# Patient Record
Sex: FEMALE | Race: WHITE | Employment: OTHER | URBAN - METROPOLITAN AREA
[De-identification: names, ages, dates, MRNs, and addresses within clinical notes are randomized per-mention and may not be internally consistent; named-entity substitution may affect disease eponyms.]

---

## 2017-01-02 DIAGNOSIS — F33.1 MAJOR DEPRESSIVE DISORDER, RECURRENT EPISODE, MODERATE (H): Primary | ICD-10-CM

## 2017-01-02 RX ORDER — SERTRALINE HYDROCHLORIDE 100 MG/1
100 TABLET, FILM COATED ORAL DAILY
Qty: 90 TABLET | Refills: 1 | Status: SHIPPED | OUTPATIENT
Start: 2017-01-02 | End: 2017-07-15

## 2017-01-02 NOTE — TELEPHONE ENCOUNTER
sertraline (ZOLOFT) 100 MG tablet     Last Written Prescription Date: 7/11/16  Last Fill Quantity: 90, # refills: 1  Last Office Visit with [facility] primary care provider:  12/5/16   Next 5 appointments (look out 90 days)     Jan 03, 2017  2:45 PM   [facility]    [address]  [facility] [address]   [phone]                   Last PHQ-9 score on record=   PHQ-9 SCORE 11/28/2016   Total Score -   Total Score 13

## 2017-01-02 NOTE — TELEPHONE ENCOUNTER
"Last routine visit with PCP was 11/28/16.    Unsure about the \"re-check in 6 weeks\", appears it is related to smoking cessation.    Routing refill request to provider for review/approval because:  Elevated PHQ9    Adry Cabrales RN  [facility]            "

## 2017-01-03 ENCOUNTER — OFFICE VISIT (OUTPATIENT)
Dept: ORTHOPEDICS | Facility: CLINIC | Age: 62
End: 2017-01-03
Payer: COMMERCIAL

## 2017-01-03 ENCOUNTER — RADIANT APPOINTMENT (OUTPATIENT)
Dept: GENERAL RADIOLOGY | Facility: CLINIC | Age: 62
End: 2017-01-03
Attending: ORTHOPAEDIC SURGERY
Payer: COMMERCIAL

## 2017-01-03 VITALS — HEIGHT: 63 IN | WEIGHT: 125 LBS | RESPIRATION RATE: 18 BRPM | BODY MASS INDEX: 22.15 KG/M2

## 2017-01-03 DIAGNOSIS — S62.310A CLOSED DISPLACED FRACTURE OF BASE OF SECOND METACARPAL BONE OF RIGHT HAND, INITIAL ENCOUNTER: Primary | ICD-10-CM

## 2017-01-03 DIAGNOSIS — S62.310A CLOSED DISPLACED FRACTURE OF BASE OF SECOND METACARPAL BONE OF RIGHT HAND, INITIAL ENCOUNTER: ICD-10-CM

## 2017-01-03 PROCEDURE — 99207 ZZC FRACTURE CARE IN GLOBAL PERIOD: CPT | Performed by: ORTHOPAEDIC SURGERY

## 2017-01-03 PROCEDURE — 73130 X-RAY EXAM OF HAND: CPT | Mod: RT

## 2017-01-03 NOTE — PATIENT INSTRUCTIONS
Please remember to call and schedule a follow up appointment if one was recommended at your earliest convenience.  Orthopedics CLINIC HOURS TELEPHONE NUMBER   Dr. Micky Gaviria  Surgical Assistant      Daxa  Medical Assistant   Mondays- 8:00 - 4:00  [facility]    Tuesdays- 8:00-4:00  [facility] in the morning and [facility] in the afternoon   Thursdays- 8:00-4:00  [facility] in the morning and [facility] in the afternoon  Specialty schedulers:   [phone] to make an appointment with any Specialty Provider.   Main Clinic:   [phone] to make an appointment with your primary provider   Urgent Care locations:    [facility]   Monday-Friday Closed  Saturday-Sunday 9 am-5pm    Monday-Friday 12 pm - 8 pm  Saturday-Sunday 9 am-5 pm   [phone]     If SURGERY has been recommended, please call our Specialty Schedulers at [phone] to schedule your procedure.    If you need a medication refill, please contact your pharmacy. Please allow 3 business days for your refill to be completed.  Use [employer] (secure e-mail communication and access to your chart) to send a message or to make an appointment. Please ask how you can sign up for [employer]apt[employer].

## 2017-01-03 NOTE — PROGRESS NOTES
Follow up closed treatment of right hand metacarpal base fracture 11/29/16.  Cast removed today.  X-ray now shows progressive healing of fractures, but it now appears there are base fractures of 2-5 metacarpals.  All fractures are in good position and healing well.  There is tenderness at base of 4th and 5th metacarpals.  Sensation, motor and circulation are intact.  Mild chronic wrist stiffness due to old radius fracture.    Assessment:  Healing multiple metacarpal fractures.  Plan:  Start range of motion.  Avoid falling or impact for 2-4 weeks.  Return to clinic 4 weeks if stiffness persists.

## 2017-01-03 NOTE — MR AVS SNAPSHOT
After Visit Summary   1/3/2017    Shruti Gifford    MRN: [id number]           Patient Information     Date Of Birth          1955        Visit Information        Provider Department      1/3/2017 2:45 PM Maximilian Weeks MD [facility]        Today's Diagnoses     Closed displaced fracture of base of second metacarpal bone of right hand, initial encounter    -  1       Care Instructions    Please remember to call and schedule a follow up appointment if one was recommended at your earliest convenience.  Orthopedics CLINIC HOURS TELEPHONE NUMBER   Dr. Micky Gaviria  Surgical Assistant      Daxa  Medical Assistant   Mondays- 8:00 - 4:00  [facility]    Tuesdays- 8:00-4:00  [facility] in the morning and [facility] in the afternoon   Thursdays- 8:00-4:00  [facility] in the morning and [facility] in the afternoon  Specialty schedulers:   [phone] to make an appointment with any Specialty Provider.   Main Clinic:   [phone] to make an appointment with your primary provider   Urgent Care locations:    [facility]   Monday-Friday Closed  Saturday-Sunday 9 am-5pm    Monday-Friday 12 pm - 8 pm  Saturday-Sunday 9 am-5 pm   [phone]     If SURGERY has been recommended, please call our Specialty Schedulers at [phone] to schedule your procedure.    If you need a medication refill, please contact your pharmacy. Please allow 3 business days for your refill to be completed.  Use [employer] (secure e-mail communication and access to your chart) to send a message or to make an appointment. Please ask how you can sign up for [employer].            Follow-ups after your visit        Who to contact     If you have questions or need follow up information about today's clinic visit or your schedule please contact [facility] directly at  "[phone].  Normal or non-critical lab and imaging results will be communicated to you by MyChart, letter or phone within 4 business days after the clinic has received the results. If you do not hear from us within 7 days, please contact the clinic through [email or web address] or phone. If you have a critical or abnormal lab result, we will notify you by phone as soon as possible.  Submit refill requests through Fetch [employer] or call your pharmacy and they will forward the refill request to us. Please allow 3 business days for your refill to be completed.          Additional Information About Your Visit        Alum[employer]nihart Information     [employer] gives you secure access to your electronic health record. If you see a primary care provider, you can also send messages to your care team and make appointments. If you have questions, please call your primary care clinic.  If you do not have a primary care provider, please call [phone] and they will assist you.        Care EveryWhere ID     This is your Care EveryWhere ID. This could be used by other organizations to access your [facility] medical records  [id number]        Your Vitals Were     Respirations Height BMI (Body Mass Index)             18 1.588 m (5' 2.5\") 22.48 kg/m2          Blood Pressure from Last 3 Encounters:   12/05/16 104/67   11/28/16 110/73   11/17/15 106/72    Weight from Last 3 Encounters:   01/03/17 56.7 kg (125 lb)   12/06/16 56.7 kg (125 lb)   12/05/16 56.7 kg (125 lb)               Primary Care Provider Office Phone # Fax #    Dick Diaz [phone]       [facility] [address] [facility] [address]        Thank you!     Thank you for choosing [facility]  for your care. Our goal is always to provide you with excellent care. Hearing back from our patients is one way we can continue to improve our services. Please take a few minutes to complete the written survey that you may receive in " the mail after your visit with us. Thank you!             Your Updated Medication List - Protect others around you: Learn how to safely use, store and throw away your medicines at www.disposemymeds.org.          This list is accurate as of: 1/3/17  4:20 PM.  Always use your most recent med list.                   Brand Name Dispense Instructions for use    ALEVE PO      Take by mouth as needed for moderate pain       aspirin 81 MG tablet      Take 1 tablet by mouth daily.       buPROPion 300 MG 24 hr tablet    WELLBUTRIN XL    30 tablet    Take 1 tablet (300 mg) by mouth every morning Needs to be seen       EX-LAX PO      Take by mouth as needed       HYDROcodone-acetaminophen 5-325 MG per tablet    NORCO    20 tablet    Take 1 tablet by mouth every 4 hours as needed for moderate to severe pain       levothyroxine 200 MCG tablet    SYNTHROID/LEVOTHROID    90 tablet    Take 1 tablet (200 mcg) by mouth daily       sertraline 100 MG tablet    ZOLOFT    90 tablet    Take 1 tablet (100 mg) by mouth daily       sodium chloride 0.65 % nasal spray    OCEAN    1 Bottle    Spray 1 spray in nostril every hour as needed for congestion (dry nares ).       traZODone 50 MG tablet    DESYREL    60 tablet    Take 2 tablets (100 mg) by mouth nightly as needed for sleep Due to be seen

## 2017-01-17 ENCOUNTER — OFFICE VISIT (OUTPATIENT)
Dept: FAMILY MEDICINE | Facility: CLINIC | Age: 62
End: 2017-01-17
Payer: COMMERCIAL

## 2017-01-17 VITALS
TEMPERATURE: 97.6 F | DIASTOLIC BLOOD PRESSURE: 57 MMHG | BODY MASS INDEX: 21.94 KG/M2 | HEART RATE: 68 BPM | SYSTOLIC BLOOD PRESSURE: 88 MMHG | OXYGEN SATURATION: 97 % | WEIGHT: 122 LBS

## 2017-01-17 DIAGNOSIS — F33.1 MAJOR DEPRESSIVE DISORDER, RECURRENT EPISODE, MODERATE (H): Primary | ICD-10-CM

## 2017-01-17 PROCEDURE — 99214 OFFICE O/P EST MOD 30 MIN: CPT | Performed by: FAMILY MEDICINE

## 2017-01-17 RX ORDER — BUPROPION HYDROCHLORIDE 300 MG/1
300 TABLET ORAL EVERY MORNING
Qty: 90 TABLET | Refills: 3 | Status: SHIPPED | OUTPATIENT
Start: 2017-01-17 | End: 2018-01-16 | Stop reason: DRUGHIGH

## 2017-01-17 ASSESSMENT — PAIN SCALES - GENERAL: PAINLEVEL: SEVERE PAIN (6)

## 2017-01-17 NOTE — MR AVS SNAPSHOT
After Visit Summary   1/17/2017    Shruti Gifford    MRN: [id number]           Patient Information     Date Of Birth          1955        Visit Information        Provider Department      1/17/2017 9:40 AM Dick Diaz MD [facility]        Today's Diagnoses     Major depressive disorder, recurrent episode, moderate (H)    -  1        Follow-ups after your visit        Who to contact     If you have questions or need follow up information about today's clinic visit or your schedule please contact [facility] directly at [phone].  Normal or non-critical lab and imaging results will be communicated to you by Click[employer] KIDShart, letter or phone within 4 business days after the clinic has received the results. If you do not hear from us within 7 days, please contact the clinic through [employer] K[employer] or phone. If you have a critical or abnormal lab result, we will notify you by phone as soon as possible.  Submit refill requests through [employer] or call your pharmacy and they will forward the refill request to us. Please allow 3 business days for your refill to be completed.          Additional Information About Your Visit        MyChart Information     [employer] gives you secure access to your electronic health record. If you see a primary care provider, you can also send messages to your care team and make appointments. If you have questions, please call your primary care clinic.  If you do not have a primary care provider, please call [phone] and they will assist you.        Care EveryWhere ID     This is your Care EveryWhere ID. This could be used by other organizations to access your [facility] medical records  [id number]        Your Vitals Were     Pulse Temperature Pulse Oximetry Breastfeeding?          68 97.6  F (36.4  C) (Oral) 97% No         Blood Pressure from Last 3 Encounters:   01/17/17 88/57   12/05/16 104/67   11/28/16 110/73    Weight from  Last 3 Encounters:   01/17/17 122 lb (55.339 kg)   01/03/17 125 lb (56.7 kg)   12/06/16 125 lb (56.7 kg)              Today, you had the following     No orders found for display         Today's Medication Changes          These changes are accurate as of: 1/17/17 10:03 AM.  If you have any questions, ask your nurse or doctor.               Stop taking these medicines if you haven't already. Please contact your care team if you have questions.     HYDROcodone-acetaminophen 5-325 MG per tablet   Commonly known as:  NORCO   Stopped by:  Dick Diaz MD                Where to get your medicines      These medications were sent to [facility] [address] [facility] - [place], [place] - [address]  [address] [address]     Phone:  [phone]    - buPROPion 300 MG 24 hr tablet             Primary Care Provider Office Phone # Fax #    Dick Diaz [phone]       [facility] [address]  [facility] [address]        Thank you!     Thank you for choosing [facility]  for your care. Our goal is always to provide you with excellent care. Hearing back from our patients is one way we can continue to improve our services. Please take a few minutes to complete the written survey that you may receive in the mail after your visit with us. Thank you!             Your Updated Medication List - Protect others around you: Learn how to safely use, store and throw away your medicines at www.disposemymeds.org.          This list is accurate as of: 1/17/17 10:03 AM.  Always use your most recent med list.                   Brand Name Dispense Instructions for use    ALEVE PO      Take by mouth as needed for moderate pain       aspirin 81 MG tablet      Take 1 tablet by mouth daily.       buPROPion 300 MG 24 hr tablet    WELLBUTRIN XL    90 tablet    Take 1 tablet (300 mg) by mouth every morning Needs to be seen       EX-LAX PO       Take by mouth as needed       levothyroxine 200 MCG tablet    SYNTHROID/LEVOTHROID    90 tablet    Take 1 tablet (200 mcg) by mouth daily       METAMUCIL PO      Take by mouth daily       sertraline 100 MG tablet    ZOLOFT    90 tablet    Take 1 tablet (100 mg) by mouth daily       sodium chloride 0.65 % nasal spray    OCEAN    1 Bottle    Spray 1 spray in nostril every hour as needed for congestion (dry nares ).       traZODone 50 MG tablet    DESYREL    60 tablet    Take 2 tablets (100 mg) by mouth nightly as needed for sleep Due to be seen

## 2017-01-17 NOTE — NURSING NOTE
"Chief Complaint   Patient presents with     Recheck Medication     RECHECK     Right hand follow up - Still having pain after casting -  Saw Dr Weeks     Refill Request     Wellbutrin       Initial BP 88/57 mmHg  Pulse 68  Temp(Src) 97.6  F (36.4  C) (Oral)  Wt 122 lb (55.339 kg)  SpO2 97%  Breastfeeding? No Estimated body mass index is 21.94 kg/(m^2) as calculated from the following:    Height as of 1/3/17: 5' 2.5\" (1.588 m).    Weight as of this encounter: 122 lb (55.339 kg).  BP completed using cuff size: regular  Hood CARRANZA      "

## 2017-01-17 NOTE — PROGRESS NOTES
SUBJECTIVE:                                                    Shruti Gifford is a 61 year old female who presents to clinic today for the following health issues:    Medication Followup of Wellbutrin    Taking Medication as prescribed: yes    Side Effects:  Only OTC Metamucil - Makes her have gas    Medication Helping Symptoms:  yes     Medication refill on Wellbutrin    Doing well with depression     She broke her hand and just got her cast off     Right hand follow up - Still having pain after casting -  Saw Dr Weeks    Past Medical History   Diagnosis Date     Hypothyroidism      Bipolar affective disorder (H)      Radiation      Head trauma      Facial trauma      facial cuts or scares     Broken bones      nose     Stroke (H)      Anxiety      Allergy history unknown        Past Surgical History   Procedure Laterality Date      section       Tonsillectomy & adenoidectomy       Hysterectomy       Carpal tunnel release rt/lt       Craniotomy  2012     Procedure:CRANIOTOMY; Elevation of compressed right temporal skull fracture; Surgeon:BOO ABDI; Location:[facility] OR     Closed reduction, percutaneous pinning finger, combined  2012     Procedure:COMBINED CLOSED REDUCTION, PERCUTANEOUS PINNING FINGER; Closed Reduction Internal Fixation and Percutaneous Pinning Right Small Finger.; Surgeon:KATHRYN TOPETE; Location:[facility]     Open reduction internal fixation wrist  2013     Procedure: OPEN REDUCTION INTERNAL FIXATION WRIST;  Open Reduction Internal Fixation Right Distal Radius Fracture ;  Surgeon: Kathryn Topete MD;  Location: [facility]     Hysterectomy, pap no longer indicated         Family History   Problem Relation Age of Onset     Depression Brother      Depression Sister      Depression Sister      Anxiety Disorder Daughter        Social History   Substance Use Topics     Smoking status: Current Every Day Smoker -- 0.50 packs/day for 34 years     Types: Cigarettes      Smokeless tobacco: Never Used     Alcohol Use: Yes      Comment: rare- 2 drinks/ month     Current Outpatient Prescriptions   Medication Sig Dispense Refill     Psyllium (METAMUCIL PO) Take by mouth daily       buPROPion (WELLBUTRIN XL) 300 MG 24 hr tablet Take 1 tablet (300 mg) by mouth every morning Needs to be seen 90 tablet 3     sertraline (ZOLOFT) 100 MG tablet Take 1 tablet (100 mg) by mouth daily 90 tablet 1     [DISCONTINUED] buPROPion (WELLBUTRIN XL) 300 MG 24 hr tablet Take 1 tablet (300 mg) by mouth every morning Needs to be seen 30 tablet 0     Naproxen Sodium (ALEVE PO) Take by mouth as needed for moderate pain       Sennosides (EX-LAX PO) Take by mouth as needed       levothyroxine (SYNTHROID, LEVOTHROID) 200 MCG tablet Take 1 tablet (200 mcg) by mouth daily 90 tablet 3     traZODone (DESYREL) 50 MG tablet Take 2 tablets (100 mg) by mouth nightly as needed for sleep Due to be seen 60 tablet 5     aspirin 81 MG tablet Take 1 tablet by mouth daily.       sodium chloride (OCEAN) 0.65 % nasal spray Spray 1 spray in nostril every hour as needed for congestion (dry nares ). 1 Bottle 0     Ros: no chest pain, chest tightness   No sob   No leg edema   No abdominal pain     Denies fever or chills   Weight stable       Problem list and histories reviewed & adjusted, as indicated.    O; BP 88/57 mmHg  Pulse 68  Temp(Src) 97.6  F (36.4  C) (Oral)  Wt 122 lb (55.339 kg)  SpO2 97%  Breastfeeding? No    I reviewed films of hand  today   Showed healing fractures of the hand and previous right wrist ORIF that lookd in good position     Her phQ-9 os 4 today   She looks good   Mood is cheerful considering her hand is broken   She is appropriately dressed   She is interactive   She is spontaneous   She is alert and oriented     Thought processes are normal       ICD-10-CM    1. Major depressive disorder, recurrent episode, moderate (H) F33.1 buPROPion (WELLBUTRIN XL) 300 MG 24 hr tablet     Healing of hand is as  expected   Wearing hand brace off and on.   Ok to continue     Ortho following

## 2017-01-18 ASSESSMENT — PATIENT HEALTH QUESTIONNAIRE - PHQ9: SUM OF ALL RESPONSES TO PHQ QUESTIONS 1-9: 4

## 2017-05-24 DIAGNOSIS — F33.1 MAJOR DEPRESSIVE DISORDER, RECURRENT EPISODE, MODERATE (H): ICD-10-CM

## 2017-05-24 RX ORDER — TRAZODONE HYDROCHLORIDE 50 MG/1
100 TABLET, FILM COATED ORAL
Qty: 60 TABLET | Refills: 5 | Status: SHIPPED | OUTPATIENT
Start: 2017-05-24 | End: 2017-11-22

## 2017-05-24 NOTE — TELEPHONE ENCOUNTER
traZODone (DESYREL) 50 MG tablet       Last Written Prescription Date: 11-28-16  Last Fill Quantity: 60; # refills: 5  Last Office Visit with [facility], [facility] or [facility] prescribing provider:  1-17-17        Last PHQ-9 score on record=   PHQ-9 SCORE 1/17/2017   Total Score 4       No results found for: AST  No results found for: ALT

## 2017-05-30 ENCOUNTER — RADIANT APPOINTMENT (OUTPATIENT)
Dept: GENERAL RADIOLOGY | Facility: CLINIC | Age: 62
End: 2017-05-30
Attending: PHYSICIAN ASSISTANT
Payer: COMMERCIAL

## 2017-05-30 ENCOUNTER — OFFICE VISIT (OUTPATIENT)
Dept: ORTHOPEDICS | Facility: CLINIC | Age: 62
End: 2017-05-30
Payer: COMMERCIAL

## 2017-05-30 VITALS — WEIGHT: 122 LBS | HEIGHT: 63 IN | TEMPERATURE: 98 F | BODY MASS INDEX: 21.62 KG/M2

## 2017-05-30 DIAGNOSIS — M79.644 PAIN OF FINGER OF RIGHT HAND: Primary | ICD-10-CM

## 2017-05-30 DIAGNOSIS — M25.531 RIGHT WRIST PAIN: ICD-10-CM

## 2017-05-30 PROCEDURE — 73130 X-RAY EXAM OF HAND: CPT | Mod: RT

## 2017-05-30 PROCEDURE — 99213 OFFICE O/P EST LOW 20 MIN: CPT | Performed by: ORTHOPAEDIC SURGERY

## 2017-05-30 NOTE — LETTER
5/30/2017       RE: Shruti Gifford  [address]  [facility] [address]           Dear Colleague,    Thank you for referring your patient, Shruti Gifford, to the [facility]. Please see a copy of my visit note below.    Follow up closed treatment of right hand metacarpal base fracture 11/29/16.  She was last seen 1/3/2017 when the cast was removed.  She now complains of pain near base of 5th metacarpal.  X-ray now shows full healing of metacarpal fractures.  All fractures are in good position.  There is tenderness at base of 5th metacarpal.  Sensation, motor and circulation are intact.  She has full range of motion of fingers and wrist.  There is pain with ulnar and radial deviation.    Assessment:  Healed multiple metacarpal fractures.  Pain may be due to scarring or stiffness.  Plan:  Continue  range of motion.  Resume activity as tolerated.    Again, thank you for allowing me to participate in the care of your patient.        Sincerely,              Maximilian Weeks MD

## 2017-05-30 NOTE — NURSING NOTE
"Chief Complaint   Patient presents with     RECHECK     right hand       Initial Temp 98  F (36.7  C) (Oral)  Ht 5' 3\" (1.6 m)  Wt 122 lb (55.3 kg)  BMI 21.61 kg/m2 Estimated body mass index is 21.61 kg/(m^2) as calculated from the following:    Height as of this encounter: 5' 3\" (1.6 m).    Weight as of this encounter: 122 lb (55.3 kg).  Medication Reconciliation: complete   Peggy Blanco MA      "

## 2017-05-31 NOTE — PROGRESS NOTES
Follow up closed treatment of right hand metacarpal base fracture 11/29/16.  She was last seen 1/3/2017 when the cast was removed.  She now complains of pain near base of 5th metacarpal.  X-ray now shows full healing of metacarpal fractures.  All fractures are in good position.  There is tenderness at base of 5th metacarpal.  Sensation, motor and circulation are intact.  She has full range of motion of fingers and wrist.  There is pain with ulnar and radial deviation.    Assessment:  Healed multiple metacarpal fractures.  Pain may be due to scarring or stiffness.  Plan:  Continue  range of motion.  Resume activity as tolerated.

## 2017-06-01 ENCOUNTER — TELEPHONE (OUTPATIENT)
Dept: FAMILY MEDICINE | Facility: CLINIC | Age: 62
End: 2017-06-01

## 2017-06-01 NOTE — TELEPHONE ENCOUNTER
Panel Management Review      Patient has the following on her problem list:     Depression / Dysthymia review  PHQ-9 SCORE 4/1/2016 11/28/2016 1/17/2017   Total Score - - -   Total Score 12 13 4      Patient is due for:  None      Composite cancer screening  Chart review shows that this patient is due/due soon for the following Colonoscopy  Summary:    Patient is due/failing the following:   COLONOSCOPY    Action needed:   Patient needs referral/order: Colon order pended    Type of outreach:    Sent [employer] message.    Questions for provider review:    None                                                                                                                                    Jackie See TERE Lugo     Chart routed to Care Team .

## 2017-06-07 NOTE — TELEPHONE ENCOUNTER
Patient is also due to a follow up on depression and needs a PHQ-9. Called and spoke with the patient and scheduled her a follow up for June 12th.

## 2017-06-12 ENCOUNTER — OFFICE VISIT (OUTPATIENT)
Dept: FAMILY MEDICINE | Facility: CLINIC | Age: 62
End: 2017-06-12
Payer: COMMERCIAL

## 2017-06-12 VITALS
BODY MASS INDEX: 21.26 KG/M2 | HEART RATE: 60 BPM | DIASTOLIC BLOOD PRESSURE: 75 MMHG | WEIGHT: 120 LBS | TEMPERATURE: 97 F | SYSTOLIC BLOOD PRESSURE: 111 MMHG | OXYGEN SATURATION: 98 %

## 2017-06-12 DIAGNOSIS — E03.9 HYPOTHYROIDISM, UNSPECIFIED TYPE: ICD-10-CM

## 2017-06-12 DIAGNOSIS — F33.1 MAJOR DEPRESSIVE DISORDER, RECURRENT EPISODE, MODERATE (H): Primary | ICD-10-CM

## 2017-06-12 PROCEDURE — 99214 OFFICE O/P EST MOD 30 MIN: CPT | Performed by: FAMILY MEDICINE

## 2017-06-12 RX ORDER — LEVOTHYROXINE SODIUM 175 UG/1
175 TABLET ORAL DAILY
Qty: 90 TABLET | Refills: 3 | Status: SHIPPED | OUTPATIENT
Start: 2017-06-12 | End: 2018-06-02

## 2017-06-12 RX ORDER — BUPROPION HYDROCHLORIDE 150 MG/1
150 TABLET, FILM COATED, EXTENDED RELEASE ORAL 2 TIMES DAILY
Qty: 180 TABLET | Refills: 1 | Status: SHIPPED | OUTPATIENT
Start: 2017-06-12 | End: 2017-11-20

## 2017-06-12 NOTE — MR AVS SNAPSHOT
After Visit Summary   6/12/2017    Shruti Gifford    MRN: [id number]           Patient Information     Date Of Birth          1955        Visit Information        Provider Department      6/12/2017 9:40 AM Dick Diaz MD [facility]        Today's Diagnoses     Major depressive disorder, recurrent episode, moderate (H)    -  1    Hypothyroidism, unspecified type           Follow-ups after your visit        Additional Services     MENTAL HEALTH REFERRAL       Your provider has referred you to: [facility]: Psychiatry Clinic [facility] [phone]   [email or web address]    Dr Sarmiento for collaborative care for adjustment of medication     All scheduling is subject to the client's specific insurance plan & benefits, provider/location availability, and provider clinical specialities.  Please arrive 15 minutes early for your first appointment and bring your completed paperwork.    Please be aware that coverage of these services is subject to the terms and limitations of your health insurance plan.  Call member services at your health plan with any benefit or coverage questions.                  Future tests that were ordered for you today     Open Future Orders        Priority Expected Expires Ordered    TSH with free T4 reflex Routine 9/12/2017 10/12/2017 6/12/2017            Who to contact     If you have questions or need follow up information about today's clinic visit or your schedule please contact [facility] directly at [phone].  Normal or non-critical lab and imaging results will be communicated to you by MyChart, letter or phone within 4 business days after the clinic has received the results. If you do not hear from us within 7 days, please contact the clinic through MyChart or phone. If you have a critical or abnormal lab result, we will notify you by phone as soon as possible.  Submit refill requests  through [employer] or call your pharmacy and they will forward the refill request to us. Please allow 3 business days for your refill to be completed.          Additional Information About Your Visit        [employer] Information     [employer] gives you secure access to your electronic health record. If you see a primary care provider, you can also send messages to your care team and make appointments. If you have questions, please call your primary care clinic.  If you do not have a primary care provider, please call [phone] and they will assist you.        Care EveryWhere ID     This is your Care EveryWhere ID. This could be used by other organizations to access your [facility] medical records  [id number]        Your Vitals Were     Pulse Temperature Pulse Oximetry BMI (Body Mass Index)          60 97  F (36.1  C) (Oral) 98% 21.26 kg/m2         Blood Pressure from Last 3 Encounters:   06/12/17 111/75   01/17/17 (!) 88/57   12/05/16 104/67    Weight from Last 3 Encounters:   06/12/17 120 lb (54.4 kg)   05/30/17 122 lb (55.3 kg)   01/17/17 122 lb (55.3 kg)              We Performed the Following     MENTAL HEALTH REFERRAL          Today's Medication Changes          These changes are accurate as of: 6/12/17 10:17 AM.  If you have any questions, ask your nurse or doctor.               Start taking these medicines.        Dose/Directions    buPROPion 150 MG 12 hr tablet   Commonly known as:  BUPROBAN   Used for:  Major depressive disorder, recurrent episode, moderate (H)   Started by:  Dick Diaz MD        Dose:  150 mg   Take 1 tablet (150 mg) by mouth 2 times daily   Quantity:  180 tablet   Refills:  1         These medicines have changed or have updated prescriptions.        Dose/Directions    levothyroxine 175 MCG tablet   Commonly known as:  SYNTHROID/LEVOTHROID   This may have changed:    - medication strength  - how much to take   Used for:  Hypothyroidism, unspecified type   Changed by:  Emily  Dick MCCOY MD        Dose:  175 mcg   Take 1 tablet (175 mcg) by mouth daily   Quantity:  90 tablet   Refills:  3            Where to get your medicines      These medications were sent to [place] [id number] - [place], [place] - [address]  [address] [address]     Phone:  [phone]     buPROPion 150 MG 12 hr tablet    levothyroxine 175 MCG tablet                Primary Care Provider Office Phone # Fax #    Dick Diaz [phone]       [facility] [address]  [facility] [address]        Thank you!     Thank you for choosing [facility]  for your care. Our goal is always to provide you with excellent care. Hearing back from our patients is one way we can continue to improve our services. Please take a few minutes to complete the written survey that you may receive in the mail after your visit with us. Thank you!             Your Updated Medication List - Protect others around you: Learn how to safely use, store and throw away your medicines at www.disposemymeds.org.          This list is accurate as of: 6/12/17 10:17 AM.  Always use your most recent med list.                   Brand Name Dispense Instructions for use    ALEVE PO      Take by mouth as needed for moderate pain       aspirin 81 MG tablet      Take 1 tablet by mouth daily.       buPROPion 150 MG 12 hr tablet    BUPROBAN    180 tablet    Take 1 tablet (150 mg) by mouth 2 times daily       buPROPion 300 MG 24 hr tablet    WELLBUTRIN XL    90 tablet    Take 1 tablet (300 mg) by mouth every morning Needs to be seen       EX-LAX PO      Take by mouth as needed       levothyroxine 175 MCG tablet    SYNTHROID/LEVOTHROID    90 tablet    Take 1 tablet (175 mcg) by mouth daily       METAMUCIL PO      Take by mouth daily       sertraline 100 MG tablet    ZOLOFT    90 tablet    Take 1 tablet (100 mg) by mouth daily       sodium chloride 0.65 % nasal  spray    OCEAN    1 Bottle    Spray 1 spray in nostril every hour as needed for congestion (dry nares ).       traZODone 50 MG tablet    DESYREL    60 tablet    Take 2 tablets (100 mg) by mouth nightly as needed for sleep

## 2017-06-12 NOTE — TELEPHONE ENCOUNTER
Patient came in for office visit 6/12/17 and states she had one done about a few years ago and hasn't been 10 years since but she will go look up her last colonoscopy.  Jackie See TERE Lugo
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
## 2017-06-12 NOTE — NURSING NOTE
"Chief Complaint   Patient presents with     Health Maintenance     phq-9     Depression       Initial /75 (BP Location: Left arm, Patient Position: Chair, Cuff Size: Adult Regular)  Pulse 60  Temp 97  F (36.1  C) (Oral)  Wt 120 lb (54.4 kg)  SpO2 98%  BMI 21.26 kg/m2 Estimated body mass index is 21.26 kg/(m^2) as calculated from the following:    Height as of 5/30/17: 5' 3\" (1.6 m).    Weight as of this encounter: 120 lb (54.4 kg).  Medication Reconciliation: complete   Jackie See TERE Lugo      "

## 2017-06-13 ASSESSMENT — PATIENT HEALTH QUESTIONNAIRE - PHQ9: SUM OF ALL RESPONSES TO PHQ QUESTIONS 1-9: 13

## 2017-07-15 DIAGNOSIS — F33.1 MAJOR DEPRESSIVE DISORDER, RECURRENT EPISODE, MODERATE (H): ICD-10-CM

## 2017-07-17 RX ORDER — SERTRALINE HYDROCHLORIDE 100 MG/1
TABLET, FILM COATED ORAL
Qty: 90 TABLET | Refills: 1 | Status: SHIPPED | OUTPATIENT
Start: 2017-07-17 | End: 2018-01-10

## 2017-07-17 NOTE — TELEPHONE ENCOUNTER
Routing refill request to provider for review/approval because:  PHQ 9 > 5  Juliette Duran, RN [facility] Triage.

## 2017-07-17 NOTE — TELEPHONE ENCOUNTER
sertraline (ZOLOFT) 100 MG tablet     Last Written Prescription Date: 1/2/17  Last Fill Quantity: 90, # refills: 1  Last Office Visit with [facility] primary care provider:  6/1217        Last PHQ-9 score on record=   PHQ-9 SCORE 6/12/2017   Total Score 13

## 2017-10-02 ENCOUNTER — TRANSFERRED RECORDS (OUTPATIENT)
Dept: HEALTH INFORMATION MANAGEMENT | Facility: CLINIC | Age: 62
End: 2017-10-02

## 2017-10-09 ENCOUNTER — TELEPHONE (OUTPATIENT)
Dept: FAMILY MEDICINE | Facility: CLINIC | Age: 62
End: 2017-10-09

## 2017-10-09 NOTE — TELEPHONE ENCOUNTER
Panel Management Review      Patient has the following on her problem list:     Depression / Dysthymia review    Measure:  Needs PHQ-9 score of 4 or less during index window.  Administer PHQ-9 and if score is 5 or more, send encounter to provider for next steps.    5 - 7 month window range: 13    PHQ-9 SCORE 11/28/2016 1/17/2017 6/12/2017   Total Score - - -   Total Score 13 4 13       If PHQ-9 recheck is 5 or more, route to provider for next steps.    Patient is due for:  None        Composite cancer screening  Chart review shows that this patient is due/due soon for the following: None  Summary:    Patient is due/failing the following:   COLONOSCOPY    Action needed:   PM encounter on 6/1/17 stated she already had one done a few years ago and has not been 10 years yet.    Type of outreach:    None    Questions for provider review:    None                                                                                                                                    Jackie Lugo MA

## 2017-11-07 ENCOUNTER — TELEPHONE (OUTPATIENT)
Dept: FAMILY MEDICINE | Facility: CLINIC | Age: 62
End: 2017-11-07

## 2017-11-07 DIAGNOSIS — F33.1 MAJOR DEPRESSIVE DISORDER, RECURRENT EPISODE, MODERATE (H): Primary | ICD-10-CM

## 2017-11-07 NOTE — TELEPHONE ENCOUNTER
"----- Message from Martinez Osmanock sent at 11/6/2017  1:46 PM CST -----  Regarding: Psychiatry Referral Needed  Good Afternoon Dr. Diaz,     Patient called to scheduled with Dr. Sarmiento.  However, the referral you submitted in June is for [facility] not [facility] (where Dr. Sarmiento is located).  Please submit a [facility] referral for [facility].     Thank you,     Our providers practice as a part of our Collaborative Care Psychiatry Service (CCPS).  Based on findings in the initial psychiatric evaluation, your patient will be seen for one or two follow-up medication management visits and returned to your care with recommendations for ongoing medication management.    Due to this collaboration, we do require the specific mental health referral, to ensure the provider understands, and agrees to have the patient return to their ongoing care.    You will be notified when psychiatry directs the patient back to you for ongoing care and all psychiatric medications will need to be prescribed by you, the referring provider. The psychiatric provider will no longer provide new prescriptions or process refills.    If this care model sounds appropriate for this client, please add that order into Epic. When the order is entered, please direct your patient to call our intake office at [phone] to schedule an initial appointment. Please let me know if you have any additional questions.    There have been recent update in Epic Orders.  To place this referral:  -Select Mental Health Referral  -Select the second option \"G: [facility]\"      "

## 2017-11-20 ENCOUNTER — MYC MEDICAL ADVICE (OUTPATIENT)
Dept: FAMILY MEDICINE | Facility: CLINIC | Age: 62
End: 2017-11-20

## 2017-11-20 DIAGNOSIS — F33.1 MAJOR DEPRESSIVE DISORDER, RECURRENT EPISODE, MODERATE (H): ICD-10-CM

## 2017-11-20 RX ORDER — BUPROPION HYDROCHLORIDE 150 MG/1
150 TABLET, FILM COATED, EXTENDED RELEASE ORAL 2 TIMES DAILY
Qty: 60 TABLET | Refills: 5 | Status: SHIPPED | OUTPATIENT
Start: 2017-11-20 | End: 2018-05-21

## 2017-11-21 ENCOUNTER — TELEPHONE (OUTPATIENT)
Dept: FAMILY MEDICINE | Facility: CLINIC | Age: 62
End: 2017-11-21

## 2017-11-21 ENCOUNTER — MYC MEDICAL ADVICE (OUTPATIENT)
Dept: FAMILY MEDICINE | Facility: CLINIC | Age: 62
End: 2017-11-21

## 2017-11-21 ASSESSMENT — PATIENT HEALTH QUESTIONNAIRE - PHQ9
10. IF YOU CHECKED OFF ANY PROBLEMS, HOW DIFFICULT HAVE THESE PROBLEMS MADE IT FOR YOU TO DO YOUR WORK, TAKE CARE OF THINGS AT HOME, OR GET ALONG WITH OTHER PEOPLE: SOMEWHAT DIFFICULT
SUM OF ALL RESPONSES TO PHQ QUESTIONS 1-9: 17
SUM OF ALL RESPONSES TO PHQ QUESTIONS 1-9: 17

## 2017-11-21 NOTE — TELEPHONE ENCOUNTER
Panel Management Review      Patient has the following on her problem list:     Depression / Dysthymia review    Measure:  Needs PHQ-9 score of 4 or less during index window.  Administer PHQ-9 and if score is 5 or more, send encounter to provider for next steps.    5   7 month window range: 11/12/2017-01/12/2018    PHQ-9 SCORE 11/28/2016 1/17/2017 6/12/2017   Total Score - - -   Total Score 13 4 13       If PHQ-9 recheck is 5 or more, route to provider for next steps.    Patient is due for:  PHQ9 and DAP        Composite cancer screening  Chart review shows that this patient is due/due soon for the following None  Summary:    Patient is due/failing the following:   DAP and PHQ9    Action needed:   Patient needs to do PHQ9.    Type of outreach:    Sent [employer] message. 11/21/2017    Questions for provider review:    None                                                                                                                                    Malini Burrell [facility]       Chart routed to Care Team .

## 2017-11-22 DIAGNOSIS — F33.1 MAJOR DEPRESSIVE DISORDER, RECURRENT EPISODE, MODERATE (H): ICD-10-CM

## 2017-11-22 ASSESSMENT — PATIENT HEALTH QUESTIONNAIRE - PHQ9
SUM OF ALL RESPONSES TO PHQ QUESTIONS 1-9: 17
SUM OF ALL RESPONSES TO PHQ QUESTIONS 1-9: 16

## 2017-11-24 RX ORDER — TRAZODONE HYDROCHLORIDE 50 MG/1
TABLET, FILM COATED ORAL
Qty: 60 TABLET | Refills: 5 | Status: SHIPPED | OUTPATIENT
Start: 2017-11-24 | End: 2018-05-16

## 2017-11-24 NOTE — TELEPHONE ENCOUNTER
Requested Prescriptions   Pending Prescriptions Disp Refills     traZODone (DESYREL) 50 MG tablet [Pharmacy Med Name: TRAZODONE 50 MG TABLET] 60 tablet 5     Sig: TAKE 2 TABLETS BY MOUTH NIGHTLY AS NEEDED FOR SLEEP    Serotonin Modulators Passed    11/22/2017  4:20 PM       Passed - Recent or future visit with authorizing provider's specialty    Patient had office visit in the last year or has a visit in the next 30 days with authorizing provider.  See chart review.              Passed - Patient is age 18 or older       Passed - No active pregnancy on record       Passed - No positive pregnancy test in past 12 months        PHQ-9 SCORE 6/12/2017 11/21/2017 11/22/2017   Total Score - - -   Total Score MyChart - 17 (Moderately severe depression) -   Total Score 13 17 16

## 2017-11-24 NOTE — TELEPHONE ENCOUNTER
Routing refill request to provider for review/approval because:  PHQ-9 score > 5      Barbi Ewing RN  [facility]

## 2017-11-30 DIAGNOSIS — E03.9 HYPOTHYROIDISM, UNSPECIFIED TYPE: ICD-10-CM

## 2017-11-30 LAB — TSH SERPL DL<=0.005 MIU/L-ACNC: 0.47 MU/L (ref 0.4–4)

## 2017-11-30 PROCEDURE — 36415 COLL VENOUS BLD VENIPUNCTURE: CPT | Performed by: FAMILY MEDICINE

## 2017-11-30 PROCEDURE — 84443 ASSAY THYROID STIM HORMONE: CPT | Performed by: FAMILY MEDICINE

## 2017-11-30 NOTE — TELEPHONE ENCOUNTER
Last PHQ-9 had no suicidal thinking   Not sure if she is having a problem   She should probably be seen

## 2017-11-30 NOTE — TELEPHONE ENCOUNTER
Notified patient of message below through [employer] as well as asked her if she is having suicidal thoughts with a plan to carry it out, intent to harm self/others.  Provided RN triage line number to call for appointment.      Joanna Rush RN  [facility]

## 2017-12-01 NOTE — TELEPHONE ENCOUNTER
Attempted to call patient at [phone] (home), no answer.  Left VM to return call to RN Triage line.  Also replied to [employer] - patient read [employer] message yesterday.    Barbi Ewing RN  [facility]

## 2017-12-01 NOTE — TELEPHONE ENCOUNTER
"It appears patient has read the MyChart note sent yesterday.  Not yet scheduled with PCP but is seeing psych in 1/16/18 (new referral).    Patient was last seen by Dr. DENISE 6/12/17.  Plan at that time was to \"refer to Dr. Sarmiento\" to advise on adjusting her medication.    Routed to provider to clarify if she also needs to see PCP or wait until January to see psych.    Adry Cabrales RN  [facility]          "
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
## 2017-12-01 NOTE — TELEPHONE ENCOUNTER
If patient feels safe now I would prefer her waiting til she sees psychiatry, but if she has a plan then she should be seen now by me.

## 2017-12-15 ENCOUNTER — MYC MEDICAL ADVICE (OUTPATIENT)
Dept: FAMILY MEDICINE | Facility: CLINIC | Age: 62
End: 2017-12-15

## 2017-12-15 ASSESSMENT — PATIENT HEALTH QUESTIONNAIRE - PHQ9
10. IF YOU CHECKED OFF ANY PROBLEMS, HOW DIFFICULT HAVE THESE PROBLEMS MADE IT FOR YOU TO DO YOUR WORK, TAKE CARE OF THINGS AT HOME, OR GET ALONG WITH OTHER PEOPLE: SOMEWHAT DIFFICULT
SUM OF ALL RESPONSES TO PHQ QUESTIONS 1-9: 13
SUM OF ALL RESPONSES TO PHQ QUESTIONS 1-9: 13

## 2017-12-16 ASSESSMENT — PATIENT HEALTH QUESTIONNAIRE - PHQ9: SUM OF ALL RESPONSES TO PHQ QUESTIONS 1-9: 13

## 2018-01-15 DIAGNOSIS — F33.1 MAJOR DEPRESSIVE DISORDER, RECURRENT EPISODE, MODERATE (H): ICD-10-CM

## 2018-01-15 NOTE — TELEPHONE ENCOUNTER
"Requested Prescriptions   Pending Prescriptions Disp Refills     sertraline (ZOLOFT) 100 MG tablet [Pharmacy Med Name: SERTRALINE  MG TABLET] 90 tablet 1    Last Written Prescription Date:  1-11-18  Last Fill Quantity: 30,  # refills: 0   Last Office Visit with [facility] or [facility] prescribing provider:  6-12-17   Future Office Visit:      Sig: TAKE 1 TABLET (100 MG) BY MOUTH DAILY    SSRIs Protocol Failed    1/15/2018  5:38 PM       Failed - PHQ-9 score less than 5 in past 6 months    The PHQ-9 criteria is meant to fail. It requires a PHQ-9 score review         Failed - Recent (6 mo) or future visit with authorizing provider's specialty    Patient had office visit in the last 6 months or has a visit in the next 30 days with authorizing provider.  See \"Patient Info\" tab in inbasket, or \"Choose Columns\" in Meds & Orders section of the refill encounter.          Passed - Patient is age 18 or older       Passed - No active pregnancy on record       Passed - No positive pregnancy test in last 12 months        traZODone (DESYREL) 50 MG tablet [Pharmacy Med Name: TRAZODONE 50 MG TABLET] 60 tablet 5    Last Written Prescription Date:  11-24-17  Last Fill Quantity: 60,  # refills: 5   Last Office Visit with [facility] or [facility] prescribing provider:  6-12-17   Future Office Visit:      Sig: TAKE 2 TABLETS BY MOUTH NIGHTLY AS NEEDED FOR SLEEP    Serotonin Modulators Passed    1/15/2018  5:38 PM       Passed - Recent or future visit with authorizing provider's specialty    Patient had office visit in the last year or has a visit in the next 30 days with authorizing provider.  See \"Patient Info\" tab in inbasket, or \"Choose Columns\" in Meds & Orders section of the refill encounter.              Passed - Patient is age 18 or older       Passed - No active pregnancy on record       Passed - No positive pregnancy test in past 12 months          "

## 2018-01-16 ENCOUNTER — OFFICE VISIT (OUTPATIENT)
Dept: PSYCHIATRY | Facility: CLINIC | Age: 63
End: 2018-01-16
Attending: FAMILY MEDICINE
Payer: MEDICARE

## 2018-01-16 DIAGNOSIS — F33.1 MAJOR DEPRESSIVE DISORDER, RECURRENT EPISODE, MODERATE (H): Primary | ICD-10-CM

## 2018-01-16 PROCEDURE — 90792 PSYCH DIAG EVAL W/MED SRVCS: CPT | Performed by: PSYCHIATRY & NEUROLOGY

## 2018-01-16 RX ORDER — LAMOTRIGINE 25 MG/1
TABLET ORAL
Qty: 42 TABLET | Refills: 0 | Status: SHIPPED | OUTPATIENT
Start: 2018-01-16 | End: 2018-05-21 | Stop reason: DRUGHIGH

## 2018-01-16 RX ORDER — SERTRALINE HYDROCHLORIDE 100 MG/1
TABLET, FILM COATED ORAL
Qty: 90 TABLET | Refills: 1 | OUTPATIENT
Start: 2018-01-16

## 2018-01-16 RX ORDER — LAMOTRIGINE 100 MG/1
TABLET ORAL
Qty: 60 TABLET | Refills: 6 | Status: SHIPPED | OUTPATIENT
Start: 2018-02-13 | End: 2018-10-29

## 2018-01-16 RX ORDER — CITALOPRAM HYDROBROMIDE 20 MG/1
20 TABLET ORAL DAILY
Qty: 30 TABLET | Refills: 2 | Status: SHIPPED | OUTPATIENT
Start: 2018-01-16 | End: 2018-04-19

## 2018-01-16 RX ORDER — TRAZODONE HYDROCHLORIDE 50 MG/1
TABLET, FILM COATED ORAL
Qty: 60 TABLET | Refills: 5 | OUTPATIENT
Start: 2018-01-16

## 2018-01-16 ASSESSMENT — ANXIETY QUESTIONNAIRES
3. WORRYING TOO MUCH ABOUT DIFFERENT THINGS: SEVERAL DAYS
1. FEELING NERVOUS, ANXIOUS, OR ON EDGE: MORE THAN HALF THE DAYS
GAD7 TOTAL SCORE: 9
2. NOT BEING ABLE TO STOP OR CONTROL WORRYING: SEVERAL DAYS
7. FEELING AFRAID AS IF SOMETHING AWFUL MIGHT HAPPEN: NOT AT ALL
5. BEING SO RESTLESS THAT IT IS HARD TO SIT STILL: MORE THAN HALF THE DAYS
6. BECOMING EASILY ANNOYED OR IRRITABLE: SEVERAL DAYS

## 2018-01-16 ASSESSMENT — PATIENT HEALTH QUESTIONNAIRE - PHQ9
SUM OF ALL RESPONSES TO PHQ QUESTIONS 1-9: 9
5. POOR APPETITE OR OVEREATING: MORE THAN HALF THE DAYS

## 2018-01-16 NOTE — PATIENT INSTRUCTIONS
Treatment Plan:  Drop Wellbutrin (bupropion)  mg to once a day (morning)   Decrease Zoloft (sertraline) to 50 mg per day for two weeks, then stop   Start Celexa (citalopram) 20 mg per day   OK to use trazodone up to 100 mg per day   Once off of Zoloft (sertraline) for 3 days, then start Lamictal (lamotrigine) 25 mg per day for 2 weeks, then 50 mg per day for 2 weeks, then to 100 mg. Rash information provided.    After one week of 100 mg Lamictal, go up to 150 mg for a week, then to 200 mg.   Safety plan reviewed. To the Emergency Department as needed or call after hours crisis line at [phone] or [phone].   Schedule an appointment with me in 8-10 weeks.  Call [facility] at [phone] to schedule.  Follow up with primary care provider as planned or for acute medical concerns.  Call the psychiatric nurse line with medication questions or concerns at [phone] MyChart may be used to communicate with your provider, but this is not intended to be used for emergencies.

## 2018-01-16 NOTE — MR AVS SNAPSHOT
MRN:[id number]                      After Visit Summary   1/16/2018    Shruti Gifford    MRN: [id number]           Visit Information        Provider Department      1/16/2018 1:00 PM Maximilian Sarmiento MD [facility]        Care Instructions      Treatment Plan:  Drop Wellbutrin (bupropion)  mg to once a day (morning)   Decrease Zoloft (sertraline) to 50 mg per day for two weeks, then stop   Start Celexa (citalopram) 20 mg per day   OK to use trazodone up to 100 mg per day   Once off of Zoloft (sertraline) for 3 days, then start Lamictal (lamotrigine) 25 mg per day for 2 weeks, then 50 mg per day for 2 weeks, then to 100 mg. Rash information provided.    After one week of 100 mg Lamictal, go up to 150 mg for a week, then to 200 mg.   Safety plan reviewed. To the Emergency Department as needed or call after hours crisis line at [phone] or [phone].   Schedule an appointment with me in 8-10 weeks.  Call [facility] at [phone] to schedule.  Follow up with primary care provider as planned or for acute medical concerns.  Call the psychiatric nurse line with medication questions or concerns at [phone] [email or web address] may be used to communicate with your provider, but this is not intended to be used for emergencies.           MyChart Information     [employer] gives you secure access to your electronic health record. If you see a primary care provider, you can also send messages to your care team and make appointments. If you have questions, please call your primary care clinic.  If you do not have a primary care provider, please call [phone] and they will assist you.        Care EveryWhere ID     This is your Care EveryWhere ID. This could be used by other organizations to access your [facility] medical records  [id number]        Equal Access to Services     CISCO BARRETT AH: Kiley Grey, tammy magallon, corby mohr  vel magana ah. So [facility] [phone].    ATENCIÓN: Si habla español, tiene a rader disposición servicios gratuitos de asistencia lingüística. Llame al [phone].    We comply with applicable federal civil rights laws and [place] laws. We do not discriminate on the basis of race, color, national origin, age, disability, sex, sexual orientation, or gender identity.
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
## 2018-01-16 NOTE — TELEPHONE ENCOUNTER
Zoloft 30 day supply sent 1/11/18.  Trazodone 6 month supply sent 11/24/17.    Looks like patient is seeing Dr. Sarmiento today, would be due to see PCP if does not want to continue with Dr. Sarmiento.    Routed to team to assist with visit if needed.    Barbi Ewing RN  [facility]

## 2018-01-17 ASSESSMENT — ANXIETY QUESTIONNAIRES: GAD7 TOTAL SCORE: 9

## 2018-05-16 DIAGNOSIS — F33.1 MAJOR DEPRESSIVE DISORDER, RECURRENT EPISODE, MODERATE (H): ICD-10-CM

## 2018-05-16 NOTE — TELEPHONE ENCOUNTER
"Requested Prescriptions   Pending Prescriptions Disp Refills     traZODone (DESYREL) 50 MG tablet [Pharmacy Med Name: TRAZODONE 50 MG TABLET] 60 tablet 5    Last Written Prescription Date:  11-24-17  Last Fill Quantity: 60,  # refills: 5   Last office visit: 6/12/2017 with prescribing provider:     Future Office Visit:     Sig: TAKE 2 TABLETS BY MOUTH NIGHTLY AS NEEDED FOR SLEEP    Serotonin Modulators Passed    5/16/2018  1:32 AM       Passed - Recent (12 mo) or future (30 days) visit within the authorizing provider's specialty    Patient had office visit in the last 12 months or has a visit in the next 30 days with authorizing provider or within the authorizing provider's specialty.  See \"Patient Info\" tab in inbasket, or \"Choose Columns\" in Meds & Orders section of the refill encounter.           Passed - Patient is age 18 or older       Passed - No active pregnancy on record       Passed - No positive pregnancy test in past 12 months          "

## 2018-05-16 NOTE — TELEPHONE ENCOUNTER
Routing refill request to provider for review/approval because:  Drug interaction warning      Radha Hernandez RN  [facility]

## 2018-05-17 DIAGNOSIS — F33.1 MAJOR DEPRESSIVE DISORDER, RECURRENT EPISODE, MODERATE (H): ICD-10-CM

## 2018-05-17 RX ORDER — TRAZODONE HYDROCHLORIDE 50 MG/1
TABLET, FILM COATED ORAL
Qty: 60 TABLET | Refills: 5 | Status: SHIPPED | OUTPATIENT
Start: 2018-05-17 | End: 2018-10-23

## 2018-05-17 RX ORDER — CITALOPRAM HYDROBROMIDE 20 MG/1
TABLET ORAL
Qty: 15 TABLET | Refills: 0 | Status: SHIPPED | OUTPATIENT
Start: 2018-05-17 | End: 2018-05-21

## 2018-05-17 NOTE — TELEPHONE ENCOUNTER
"Requested Prescriptions   Pending Prescriptions Disp Refills     citalopram (CELEXA) 20 MG tablet [Pharmacy Med Name: CITALOPRAM HBR 20 MG TABLET] 30 tablet 0    Last Written Prescription Date:  4-19-18  Last Fill Quantity: 30,  # refills: 0   Last office visit: 6/12/2017 with prescribing provider:     Future Office Visit:     Sig: TAKE 1 TABLET BY MOUTH DAILY. *NEED APPOINTMENT FOR FURTHER REFILLS*    SSRIs Protocol Failed    5/17/2018  5:06 AM       Failed - PHQ-9 score less than 5 in past 6 months    Please review last PHQ-9 score.          Failed - Recent (6 mo) or future (30 days) visit within the authorizing provider's specialty    Patient had office visit in the last 6 months or has a visit in the next 30 days with authorizing provider or within the authorizing provider's specialty.  See \"Patient Info\" tab in inbasket, or \"Choose Columns\" in Meds & Orders section of the refill encounter.           Passed - Patient is age 18 or older       Passed - No active pregnancy on record       Passed - No positive pregnancy test in last 12 months          "

## 2018-05-17 NOTE — TELEPHONE ENCOUNTER
PHQ-9 score:    PHQ-9 SCORE 1/16/2018   Total Score -   Total Score MyChart -   Total Score 9     Routing refill request to provider for review/approval because:  Patient needs to be seen  Given asaf period last refill  PHQ 9 > 5  Juliette Duran RN [facility] Triage.

## 2018-05-21 ENCOUNTER — RADIANT APPOINTMENT (OUTPATIENT)
Dept: MAMMOGRAPHY | Facility: CLINIC | Age: 63
End: 2018-05-21
Attending: FAMILY MEDICINE
Payer: COMMERCIAL

## 2018-05-21 ENCOUNTER — OFFICE VISIT (OUTPATIENT)
Dept: FAMILY MEDICINE | Facility: CLINIC | Age: 63
End: 2018-05-21
Payer: COMMERCIAL

## 2018-05-21 VITALS
OXYGEN SATURATION: 95 % | BODY MASS INDEX: 23.39 KG/M2 | DIASTOLIC BLOOD PRESSURE: 68 MMHG | SYSTOLIC BLOOD PRESSURE: 98 MMHG | HEIGHT: 63 IN | WEIGHT: 132 LBS | TEMPERATURE: 97.4 F | HEART RATE: 79 BPM

## 2018-05-21 DIAGNOSIS — E03.4 HYPOTHYROIDISM DUE TO ACQUIRED ATROPHY OF THYROID: ICD-10-CM

## 2018-05-21 DIAGNOSIS — Z12.31 ENCOUNTER FOR SCREENING MAMMOGRAM FOR BREAST CANCER: ICD-10-CM

## 2018-05-21 DIAGNOSIS — F33.1 MAJOR DEPRESSIVE DISORDER, RECURRENT EPISODE, MODERATE (H): Primary | ICD-10-CM

## 2018-05-21 PROCEDURE — 77067 SCR MAMMO BI INCL CAD: CPT | Mod: TC

## 2018-05-21 PROCEDURE — 99213 OFFICE O/P EST LOW 20 MIN: CPT | Performed by: FAMILY MEDICINE

## 2018-05-21 RX ORDER — BUPROPION HYDROCHLORIDE 150 MG/1
150 TABLET, FILM COATED, EXTENDED RELEASE ORAL DAILY PRN
Qty: 90 TABLET | Refills: 3 | Status: SHIPPED | OUTPATIENT
Start: 2018-05-21 | End: 2019-05-20

## 2018-05-21 RX ORDER — CITALOPRAM HYDROBROMIDE 20 MG/1
20 TABLET ORAL DAILY
Qty: 90 TABLET | Refills: 3 | Status: SHIPPED | OUTPATIENT
Start: 2018-05-21 | End: 2019-06-01

## 2018-05-21 NOTE — MR AVS SNAPSHOT
After Visit Summary   5/21/2018    Shruti Gifford    MRN: [id number]           Patient Information     Date Of Birth          1955        Visit Information        Provider Department      5/21/2018 8:20 AM Dick Diaz MD [facility]        Today's Diagnoses     Encounter for screening mammogram for breast cancer    -  1    Major depressive disorder, recurrent episode, moderate (H)        Hypothyroidism due to acquired atrophy of thyroid           Follow-ups after your visit        Your next 10 appointments already scheduled     May 21, 2018  9:15 AM CDT   MA SCREENING DIGITAL BILATERAL with CPMA1   [facility]    [address]  [place] [address]   [phone]           Do not use any powder, lotion or deodorant under your arms or on your breast. If you do, we will ask you to remove it before your exam.  Wear comfortable, two-piece clothing.  If you have any allergies, tell your care team.  Bring any previous mammograms from other facilities or have them mailed to the breast center.              Future tests that were ordered for you today     Open Future Orders        Priority Expected Expires Ordered    TSH with free T4 reflex Routine 11/1/2018 5/21/2019 5/21/2018    *MA Screening Digital Bilateral Routine  5/21/2019 5/21/2018            Who to contact     If you have questions or need follow up information about today's clinic visit or your schedule please contact [facility] directly at [phone].  Normal or non-critical lab and imaging results will be communicated to you by MyChart, letter or phone within 4 business days after the clinic has received the results. If you do not hear from us within 7 days, please contact the clinic through MyChart or phone. If you have a critical or abnormal lab result, we will notify you by phone as soon as possible.  Submit  "refill requests through [employer] or call your pharmacy and they will forward the refill request to us. Please allow 3 business days for your refill to be completed.          Additional Information About Your Visit        [facility] Information     [employer] gives you secure access to your electronic health record. If you see a primary care provider, you can also send messages to your care team and make appointments. If you have questions, please call your primary care clinic.  If you do not have a primary care provider, please call [phone] and they will assist you.        Care EveryWhere ID     This is your Care EveryWhere ID. This could be used by other organizations to access your [facility] medical records  [id number]        Your Vitals Were     Pulse Temperature Height Pulse Oximetry BMI (Body Mass Index)       79 97.4  F (36.3  C) (Oral) 5' 3.39\" (1.61 m) 95% 23.1 kg/m2        Blood Pressure from Last 3 Encounters:   05/21/18 98/68   06/12/17 111/75   01/17/17 (!) 88/57    Weight from Last 3 Encounters:   05/21/18 132 lb (59.9 kg)   06/12/17 120 lb (54.4 kg)   05/30/17 122 lb (55.3 kg)              We Performed the Following     DEPRESSION ACTION PLAN (DAP)          Today's Medication Changes          These changes are accurate as of 5/21/18  8:51 AM.  If you have any questions, ask your nurse or doctor.               These medicines have changed or have updated prescriptions.        Dose/Directions    citalopram 20 MG tablet   Commonly known as:  celeXA   This may have changed:  See the new instructions.   Used for:  Major depressive disorder, recurrent episode, moderate (H)   Changed by:  Dick Diaz MD        Dose:  20 mg   Take 1 tablet (20 mg) by mouth daily   Quantity:  90 tablet   Refills:  3       lamoTRIgine 100 MG tablet   Commonly known as:  LaMICtal   This may have changed:    - when to take this  - additional instructions  - Another medication with the same name was removed. Continue " taking this medication, and follow the directions you see here.   Used for:  Major depressive disorder, recurrent episode, moderate (H)        Take 1 per day for a week, then 1 and 1/2 per day for a week, then 2 per day   Quantity:  60 tablet   Refills:  6            Where to get your medicines      These medications were sent to [facility] [id number] [facility] - [place], [place] - [address]  [address] [address]     Phone:  [phone]     buPROPion 150 MG 12 hr tablet    citalopram 20 MG tablet                Primary Care Provider Office Phone # Fax #    Dick Diaz [phone] [address] [address]        Equal Access to Services     CISCO BARRETT : Hadii aad ku hadasho Sofranali, waaxda luqadaha, qaybta kaalmada adeegyada, waxay chriss hughes. So [facility] [phone].    ATENCIÓN: Si habla español, tiene a rader disposición servicios gratuitos de asistencia lingüística. [facility] [phone].    We comply with applicable federal civil rights laws and [place] laws. We do not discriminate on the basis of race, color, national origin, age, disability, sex, sexual orientation, or gender identity.            Thank you!     Thank you for choosing [facility]  for your care. Our goal is always to provide you with excellent care. Hearing back from our patients is one way we can continue to improve our services. Please take a few minutes to complete the written survey that you may receive in the mail after your visit with us. Thank you!             Your Updated Medication List - Protect others around you: Learn how to safely use, store and throw away your medicines at www.disposemymeds.org.          This list is accurate as of 5/21/18  8:51 AM.  Always use your most recent med list.                   Brand Name Dispense Instructions for use Diagnosis    ALEVE PO      Take by mouth as needed for moderate  pain        aspirin 81 MG tablet      Take 1 tablet by mouth daily.        buPROPion 150 MG 12 hr tablet    BUPROBAN    90 tablet    Take 1 tablet (150 mg) by mouth daily as needed    Major depressive disorder, recurrent episode, moderate (H)       citalopram 20 MG tablet    celeXA    90 tablet    Take 1 tablet (20 mg) by mouth daily    Major depressive disorder, recurrent episode, moderate (H)       EX-LAX PO      Take by mouth as needed        lamoTRIgine 100 MG tablet    LaMICtal    60 tablet    Take 1 per day for a week, then 1 and 1/2 per day for a week, then 2 per day    Major depressive disorder, recurrent episode, moderate (H)       levothyroxine 175 MCG tablet    SYNTHROID/LEVOTHROID    90 tablet    Take 1 tablet (175 mcg) by mouth daily    Hypothyroidism, unspecified type       METAMUCIL PO      Take by mouth daily        sertraline 100 MG tablet    ZOLOFT    30 tablet    TAKE 1 TABLET (100 MG) BY MOUTH DAILY    Major depressive disorder, recurrent episode, moderate (H)       sodium chloride 0.65 % nasal spray    OCEAN    1 Bottle    Spray 1 spray in nostril every hour as needed for congestion (dry nares ).    Nasal fracture, Facial laceration       traZODone 50 MG tablet    DESYREL    60 tablet    TAKE 2 TABLETS BY MOUTH NIGHTLY AS NEEDED FOR SLEEP    Major depressive disorder, recurrent episode, moderate (H)

## 2018-05-21 NOTE — PROGRESS NOTES
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       buPROPion (BUPROBAN) 150 MG 12 hr tablet Take 1 tablet (150 mg) by mouth 2 times daily (Patient taking differently: Take 150 mg by mouth daily as needed ) 60 tablet 5     citalopram (CELEXA) 20 MG tablet TAKE 1 TABLET BY MOUTH DAILY. *NEED APPOINTMENT FOR FURTHER REFILLS* 15 tablet 0     lamoTRIgine (LAMICTAL) 100 MG tablet Take 1 per day for a week, then 1 and 1/2 per day for a week, then 2 per day (Patient taking differently: 2 times daily Take 1 per day for a week, then 1 and 1/2 per day for a week, then 2 per day) 60 tablet 6     levothyroxine (SYNTHROID/LEVOTHROID) 175 MCG tablet Take 1 tablet (175 mcg) by mouth daily 90 tablet 3     Naproxen Sodium (ALEVE PO) Take by mouth as needed for moderate pain       Psyllium (METAMUCIL PO) Take by mouth daily       Sennosides (EX-LAX PO) Take by mouth as needed       sertraline (ZOLOFT) 100 MG tablet TAKE 1 TABLET (100 MG) BY MOUTH DAILY 30 tablet 0     sodium chloride (OCEAN) 0.65 % nasal spray Spray 1 spray in nostril every hour as needed for congestion (dry nares ). 1 Bottle 0     traZODone (DESYREL) 50 MG tablet TAKE 2 TABLETS BY MOUTH NIGHTLY AS NEEDED FOR SLEEP 60 tablet 5                   She has trouble with sleeping   She does not seem to have any benefit from exercising       Problem list and histories reviewed & adjusted, as indicated.    Reviewed and updated as needed this visit by clinical staff  Tobacco  Allergies  Meds  Med Hx  Surg Hx  Fam Hx  Soc Hx      Reviewed and updated as needed this visit by  "Provider            Ros: no chest pain, chest tightness   No sob   No leg edema   No abdominal pain     Denies fever or chills   Weight stable      ROS: 10 point ROS neg other than the symptoms noted above in the HPI.      O: BP 98/68 (BP Location: Right arm, Patient Position: Chair, Cuff Size: Adult Regular)  Pulse 79  Temp 97.4  F (36.3  C) (Oral)  Ht 5' 3.39\" (1.61 m)  Wt 132 lb (59.9 kg)  SpO2 95%  BMI 23.1 kg/m2    Head: Normocephalic, atraumatic.  Eyes: Conjunctiva clear, non icteric. PERRLA.  Ears: External ears and TMs normal BL.  Nose: Septum midline, nasal mucosa pink and moist. No discharge.  Mouth / Throat: Normal dentition.  No oral lesions. Pharynx non erythematous, tonsils without hypertrophy.  Neck: Supple, no enlarged LN, trachea midline.    No bruits in the neck     Chest wall normal to inspection and palpation. Good excursion bilaterally. Lungs clear to auscultation. Good air movement bilaterally without rales, wheezes, or rhonchi.   Regular rate and  rhythm. S1 and S2 normal, no murmurs, clicks, gallops or rubs. No edema or JVD.      ICD-10-CM    1. Encounter for screening mammogram for breast cancer Z12.31 *MA Screening Digital Bilateral   2. Major depressive disorder, recurrent episode, moderate (H) F33.1 citalopram (CELEXA) 20 MG tablet     buPROPion (BUPROBAN) 150 MG 12 hr tablet   3. Hypothyroidism due to acquired atrophy of thyroid E03.4 TSH with free T4 reflex         No problems with meds   Refilled meds for 1 year   PHQ-9 shows good control for her depression   Meds are working without side effects       Recheck in 1 year   "

## 2018-05-22 ASSESSMENT — PATIENT HEALTH QUESTIONNAIRE - PHQ9: SUM OF ALL RESPONSES TO PHQ QUESTIONS 1-9: 5

## 2018-06-02 DIAGNOSIS — E03.9 HYPOTHYROIDISM, UNSPECIFIED TYPE: ICD-10-CM

## 2018-06-04 RX ORDER — LEVOTHYROXINE SODIUM 175 UG/1
TABLET ORAL
Qty: 90 TABLET | Refills: 1 | Status: SHIPPED | OUTPATIENT
Start: 2018-06-04 | End: 2018-11-26

## 2018-06-04 NOTE — TELEPHONE ENCOUNTER
Prescription approved per [facility] Refill Protocol.  Adry Cabrales RN  [facility]

## 2018-06-04 NOTE — TELEPHONE ENCOUNTER
"Requested Prescriptions   Pending Prescriptions Disp Refills     levothyroxine (SYNTHROID/LEVOTHROID) 175 MCG tablet [Pharmacy Med Name: LEVOTHYROXINE 175 MCG TABLET] 90 tablet 3    Last Written Prescription Date:  6/12/17  Last Fill Quantity: 90,  # refills: 3   Last office visit: 5/21/2018 with prescribing provider:     Future Office Visit:     Sig: TAKE 1 TABLET BY MOUTH EVERY DAY    Thyroid Protocol Passed    6/2/2018  9:38 AM       Passed - Patient is 12 years or older       Passed - Recent (12 mo) or future (30 days) visit within the authorizing provider's specialty    Patient had office visit in the last 12 months or has a visit in the next 30 days with authorizing provider or within the authorizing provider's specialty.  See \"Patient Info\" tab in inbasket, or \"Choose Columns\" in Meds & Orders section of the refill encounter.           Passed - Normal TSH on file in past 12 months    Recent Labs   Lab Test  11/30/17   1019   TSH  0.47             Passed - No active pregnancy on record    If patient is pregnant or has had a positive pregnancy test, please check TSH.         Passed - No positive pregnancy test in past 12 months    If patient is pregnant or has had a positive pregnancy test, please check TSH.            "

## 2018-06-14 ENCOUNTER — MYC MEDICAL ADVICE (OUTPATIENT)
Dept: FAMILY MEDICINE | Facility: CLINIC | Age: 63
End: 2018-06-14

## 2018-06-14 NOTE — TELEPHONE ENCOUNTER
I see normal letter was to have been sent to patient 5/22/18.    I copied text of letter and routed to patient via [employer].  Adry Cabrales RN  [facility]

## 2018-09-29 ENCOUNTER — NURSE TRIAGE (OUTPATIENT)
Dept: NURSING | Facility: CLINIC | Age: 63
End: 2018-09-29

## 2018-09-29 ENCOUNTER — APPOINTMENT (OUTPATIENT)
Dept: GENERAL RADIOLOGY | Facility: CLINIC | Age: 63
End: 2018-09-29
Attending: INTERNAL MEDICINE
Payer: MEDICARE

## 2018-09-29 ENCOUNTER — HOSPITAL ENCOUNTER (EMERGENCY)
Facility: CLINIC | Age: 63
Discharge: HOME | End: 2018-09-29
Attending: INTERNAL MEDICINE | Admitting: INTERNAL MEDICINE
Payer: MEDICARE

## 2018-09-29 VITALS
OXYGEN SATURATION: 96 % | HEART RATE: 82 BPM | TEMPERATURE: 99 F | SYSTOLIC BLOOD PRESSURE: 155 MMHG | DIASTOLIC BLOOD PRESSURE: 93 MMHG | RESPIRATION RATE: 16 BRPM

## 2018-09-29 DIAGNOSIS — S62.310A CLOSED DISPLACED FRACTURE OF BASE OF SECOND METACARPAL BONE OF RIGHT HAND, INITIAL ENCOUNTER: ICD-10-CM

## 2018-09-29 DIAGNOSIS — S62.327A CLOSED DISPLACED FRACTURE OF SHAFT OF FIFTH METACARPAL BONE OF LEFT HAND, INITIAL ENCOUNTER: ICD-10-CM

## 2018-09-29 PROCEDURE — 73130 X-RAY EXAM OF HAND: CPT | Mod: LT

## 2018-09-29 PROCEDURE — 99283 EMERGENCY DEPT VISIT LOW MDM: CPT | Mod: 25 | Performed by: INTERNAL MEDICINE

## 2018-09-29 PROCEDURE — 29125 APPL SHORT ARM SPLINT STATIC: CPT | Performed by: INTERNAL MEDICINE

## 2018-09-29 PROCEDURE — 29125 APPL SHORT ARM SPLINT STATIC: CPT | Mod: Z6 | Performed by: INTERNAL MEDICINE

## 2018-09-29 PROCEDURE — 99284 EMERGENCY DEPT VISIT MOD MDM: CPT | Performed by: INTERNAL MEDICINE

## 2018-09-29 ASSESSMENT — ENCOUNTER SYMPTOMS
ABDOMINAL PAIN: 0
SHORTNESS OF BREATH: 0
FEVER: 0

## 2018-09-29 NOTE — TELEPHONE ENCOUNTER
Pt reports that a TV fell on her hand.  Pain at rest 3/10, 5/10 with movement.  Denies open areas, numbness, deformity.     Disposition:  See a provider within 24 hours.  UC hours for BK given as requested.  Pt stated her understanding and had no further questions.     Reason for Disposition    [1] High-risk adult (e.g., age > 60, osteoporosis, chronic steroid use) AND [2] still hurts    Additional Information    Negative: Serious injury with multiple fractures    Negative: [1] Major bleeding (e.g., actively dripping or spurting) AND [2] can't be stopped    Negative: Amputation    Negative: Sounds like a life-threatening emergency to the triager    Negative: Finger injury is main concern    Negative: Caused by an animal bite    Negative: Caused by a human bite    Negative: Wound looks infected    Negative: Bullet wound, stabbed by knife, or other serious penetrating wound    Negative: Looks like a broken bone (e.g., knuckle is gone or depressed)    Negative: Looks like a dislocated joint (e.g., crooked or deformed)    Negative: Cut over knuckle (MCP joint)    Negative: Skin is split open or gaping  (or length > 1/2 inch or 12 mm)    Negative: [1] Bleeding AND [2] won't stop after 10 minutes of direct pressure (using correct technique)    Negative: [1] Dirt in the wound AND [2] not removed with 15 minutes of scrubbing    Negative: [1] Numbness (loss of sensation) of finger(s) AND [2] present now    Negative: High pressure injection injury (e.g., from grease gun or paint gun, usually work-related)    Negative: Sounds like a serious injury to the triager    Negative: [1] SEVERE pain AND [2] not improved 2 hours after pain medicine/ice packs    Negative: [1] Large swelling or bruise (> 2 inches or 5 cm)    AND [2] can't use injured hand normally (e.g., make a fist, open fully, hold a glass of water)    Negative: Suspicious history for the injury    Negative: Can't use injured hand normally (e.g., make a fist, open  fully, hold a glass of water)    Negative: Large swelling or bruise (> 2 inches or 5 cm)    Protocols used: HAND AND WRIST INJURY-ADULT-AH

## 2018-09-29 NOTE — ED AVS SNAPSHOT
[facility], Emergency Department    [address]    [place] [address]    Phone:  [phone]    Fax:  [phone]                                       Shruti Gifford   MRN: [id number]    Department:  [facility], Emergency Department   Date of Visit:  9/29/2018           Patient Information     Date Of Birth          1955        Your diagnoses for this visit were:     Closed displaced fracture of shaft of fifth metacarpal bone of left hand, initial encounter        You were seen by Keyon Rapp MD.      Follow-up Information     Follow up with Dick Diaz MD.    Specialty:  Family Practice    Contact information:    [address] [place] [address] [phone]          Discharge Instructions       Leave splint on. Keep the splint dry.  Follow up with orthopedics next week. You should receive a call Monday to schedule. Contact the clinic at [phone] if you do not hear from them by Tuesday.   Return as needed for new or worsening symptoms.     24 Hour Appointment Hotline       To make an appointment at any [facility], call [phone] ([phone]). If you don't have a family doctor or clinic, we will help you find one. [facility] are conveniently located to serve the needs of you and your family.          ED Discharge Orders     Follow up with [facility]       You should receive a call from [facility] to schedule your follow up appointment. If you do not hear from them within 24 business hours, call [phone], option 3 for help in scheduling your follow up.  If you are seen in the Emergency Department over the weekend, you will receive a phone call on the next business day.                     Review of your medicines      Our records show that you are taking the medicines listed below. If these are incorrect, please call your family doctor or clinic.        Dose / Directions Last dose taken    ALEVE PO         Take by mouth as needed for moderate pain   Refills:  0        aspirin 81 MG tablet   Dose:  1 tablet        Take 1 tablet by mouth daily.   Refills:  0        buPROPion 150 MG 12 hr tablet   Commonly known as:  BUPROBAN   Dose:  150 mg   Quantity:  90 tablet        Take 1 tablet (150 mg) by mouth daily as needed   Refills:  3        citalopram 20 MG tablet   Commonly known as:  celeXA   Dose:  20 mg   Quantity:  90 tablet        Take 1 tablet (20 mg) by mouth daily   Refills:  3        EX-LAX PO        Take by mouth as needed   Refills:  0        lamoTRIgine 100 MG tablet   Commonly known as:  LaMICtal   Quantity:  60 tablet        Take 1 per day for a week, then 1 and 1/2 per day for a week, then 2 per day   Refills:  6        levothyroxine 175 MCG tablet   Commonly known as:  SYNTHROID/LEVOTHROID   Quantity:  90 tablet        TAKE 1 TABLET BY MOUTH EVERY DAY   Refills:  1        METAMUCIL PO        Take by mouth daily   Refills:  0        sertraline 100 MG tablet   Commonly known as:  ZOLOFT   Quantity:  30 tablet        TAKE 1 TABLET (100 MG) BY MOUTH DAILY   Refills:  0        sodium chloride 0.65 % nasal spray   Commonly known as:  OCEAN   Dose:  1 spray   Quantity:  1 Bottle        Spray 1 spray in nostril every hour as needed for congestion (dry nares ).   Refills:  0        traZODone 50 MG tablet   Commonly known as:  DESYREL   Quantity:  60 tablet        TAKE 2 TABLETS BY MOUTH NIGHTLY AS NEEDED FOR SLEEP   Refills:  5                Procedures and tests performed during your visit     XR Hand Left G/E 3 Views      Orders Needing Specimen Collection     None      Pending Results     No orders found from 9/27/2018 to 9/30/2018.            Pending Culture Results     No orders found from 9/27/2018 to 9/30/2018.            Pending Results Instructions     If you had any lab results that were not finalized at the time of your Discharge, you can call the ED Lab Result RN at [phone]. You will be  contacted by this team for any positive Lab results or changes in treatment. The nurses are available 7 days a week from 10A to 6:30P.  You can leave a message 24 hours per day and they will return your call.        Thank you for choosing [facility]       Thank you for choosing [facility] for your care. Our goal is always to provide you with excellent care. Hearing back from our patients is one way we can continue to improve our services. Please take a few minutes to complete the written survey that you may receive in the mail after you visit with us. Thank you!        [facility] Information     [facility] gives you secure access to your electronic health record. If you see a primary care provider, you can also send messages to your care team and make appointments. If you have questions, please call your primary care clinic.  If you do not have a primary care provider, please call [phone] and they will assist you.        Care EveryWhere ID     This is your Care EveryWhere ID. This could be used by other organizations to access your [facility] medical records  [id number]        Equal Access to Services     CISCO BARRETT : Hadii katie shuklao Sojermaine, waaxda luqadaha, qaybta kaalmatitus magana, vel bagley . So [facility] [phone].    ATENCIÓN: Si habla español, tiene a rader disposición servicios gratuitos de asistencia lingüística. Llame al [phone].    We comply with applicable federal civil rights laws and [place] laws. We do not discriminate on the basis of race, color, national origin, age, disability, sex, sexual orientation, or gender identity.            After Visit Summary       This is your record. Keep this with you and show to your community pharmacist(s) and doctor(s) at your next visit.

## 2018-09-29 NOTE — ED AVS SNAPSHOT
[facility], [place], Emergency Department    [address]    [place] [address]    Phone:  [phone]    Fax:  [phone]                                       Shruti Gifford   MRN: [id number]    Department:  [facility], Emergency Department   Date of Visit:  9/29/2018           After Visit Summary Signature Page     I have received my discharge instructions, and my questions have been answered. I have discussed any challenges I see with this plan with the nurse or doctor.    ..........................................................................................................................................  Patient/Patient Representative Signature      ..........................................................................................................................................  Patient Representative Print Name and Relationship to Patient    ..................................................               ................................................  Date                                   Time    ..........................................................................................................................................  Reviewed by Signature/Title    ...................................................              ..............................................  Date                                               Time          22EPIC Rev 08/18

## 2018-09-30 NOTE — ED PROVIDER NOTES
"  History     Chief Complaint   Patient presents with     Hand Injury     Pt dropped a 42\" tv on her (L) hand at approx 1200 today. (L) hand swollen     HPI  Shruti Gifford is a 63 year old female who presents with a left hand injury. She tipped a TV off a stand at about noon today. It fell, landing on her left hand. She has pain and swelling of the 5th metacarpal and to lesser degree over the 4th metacarpal. She has no numbness or weakness. She has no pain in the fingers, wrist or forearm.    PAST MEDICAL HISTORY:   Past Medical History:   Diagnosis Date     Allergy history unknown      Anxiety      Arthritis      Bipolar affective disorder (H)      Broken bones     nose     Cerebral infarction (H) 01     Depressive disorder      Facial trauma     facial cuts or scares     Head trauma      Hypothyroidism      Radiation      Stroke (H)      Thyroid disease        PAST SURGICAL HISTORY:   Past Surgical History:   Procedure Laterality Date     ABDOMEN SURGERY      approximate year of part. hyster     CARPAL TUNNEL RELEASE RT/LT        SECTION       CLOSED REDUCTION, PERCUTANEOUS PINNING FINGER, COMBINED  2012    Procedure:COMBINED CLOSED REDUCTION, PERCUTANEOUS PINNING FINGER; Closed Reduction Internal Fixation and Percutaneous Pinning Right Small Finger.; Surgeon:KATHRYN TOPETE; Location: OR     CRANIOTOMY  2012    Procedure:CRANIOTOMY; Elevation of compressed right temporal skull fracture; Surgeon:BOO ABDI; Location: OR     ENT SURGERY  2011    broken nose     GYN SURGERY       HEAD & NECK SURGERY  2012     HYSTERECTOMY       HYSTERECTOMY, PAP NO LONGER INDICATED       OPEN REDUCTION INTERNAL FIXATION WRIST  2013    Procedure: OPEN REDUCTION INTERNAL FIXATION WRIST;  Open Reduction Internal Fixation Right Distal Radius Fracture ;  Surgeon: Kathryn Topete MD;  Location:  OR     ORTHOPEDIC SURGERY  2012     TONSILLECTOMY & ADENOIDECTOMY   "       FAMILY HISTORY:   Family History   Problem Relation Age of Onset     Other Cancer Mother      Coronary Artery Disease Father      Depression Brother      Depression Sister      Depression Sister      Anxiety Disorder Daughter      Coronary Artery Disease Sister      Obesity Sister      Anesthesia Reaction Daughter      Thyroid Disease Daughter        SOCIAL HISTORY:   Social History   Substance Use Topics     Smoking status: Current Every Day Smoker     Packs/day: 0.50     Years: 34.00     Types: Cigarettes     Start date: 6/1/1980     Smokeless tobacco: Never Used     Alcohol use Yes      Comment: rare- 2 drinks/ month         I have reviewed the Medications, Allergies, Past Medical and Surgical History, and Social History in the Epic system.    Review of Systems   Constitutional: Negative for fever.   Respiratory: Negative for shortness of breath.    Cardiovascular: Negative for chest pain.   Gastrointestinal: Negative for abdominal pain.   All other systems reviewed and are negative.      Physical Exam   BP: 150/82  Heart Rate: 93  Temp: 97.3  F (36.3  C)  Resp: 16  SpO2: 96 %      Physical Exam   Constitutional: She is oriented to person, place, and time. She appears well-developed and well-nourished.   HENT:   Head: Normocephalic.   Neck: Normal range of motion.   Cardiovascular: Normal rate.    Pulmonary/Chest: Effort normal.   Musculoskeletal: She exhibits tenderness.        Left hand: She exhibits tenderness, bony tenderness, deformity and swelling. She exhibits normal two-point discrimination, normal capillary refill and no laceration. Normal sensation noted. Normal strength noted.        Hands:  Full AROM of all fingers and thumb.   Neurological: She is alert and oriented to person, place, and time.   Skin: Skin is warm and dry.   Psychiatric: She has a normal mood and affect. Her behavior is normal.   Nursing note and vitals reviewed.      ED Course     ED Course     Orthopedic injury tx  Date/Time:  9/29/2018 8:00 PM  Performed by: MARTHA LAW  Authorized by: MARTHA LAW   Consent: Verbal consent not obtained. Written consent not obtained.  Risks and benefits: risks, benefits and alternatives were discussed  Consent given by: patient  Patient understanding: patient states understanding of the procedure being performed  Patient consent: the patient's understanding of the procedure matches consent given  Procedure consent: procedure consent matches procedure scheduled  Relevant documents: relevant documents present and verified  Test results: test results available and properly labeled  Site marked: the operative site was marked  Imaging studies: imaging studies available  Required items: required blood products, implants, devices, and special equipment available  Patient identity confirmed: verbally with patient and arm band  Injury location: hand  Injury type: fracture  Fracture type: fifth metacarpal  Pre-procedure neurovascular assessment: neurovascularly intact  Pre-procedure distal perfusion: normal  Pre-procedure neurological function: normal  Pre-procedure range of motion: normal    Anesthesia:  Local anesthesia used: no    Sedation:  Patient sedated: no  Manipulation performed: no  Immobilization: splint  Splint type: ulnar gutter  Supplies used: Ortho-Glass  Post-procedure neurovascular assessment: post-procedure neurovascularly intact  Post-procedure distal perfusion: normal  Post-procedure neurological function: normal  Post-procedure range of motion: normal  Patient tolerance: Patient tolerated the procedure well with no immediate complications                  Assessments & Plan (with Medical Decision Making)   Impression:  Minimally displaced mid shaft left 5th metacarpal fracture. Blunt trauma from TV falling on the hand. CMS intact.      I have reviewed the nursing notes.    I have reviewed the findings, diagnosis, plan and need for follow up with the patient.    New  Prescriptions    No medications on file       Final diagnoses:   Closed displaced fracture of shaft of fifth metacarpal bone of left hand, initial encounter       9/29/2018   [facility], [place], EMERGENCY DEPARTMENT     Keyon Rapp MD  09/29/18 2025

## 2018-09-30 NOTE — DISCHARGE INSTRUCTIONS
Leave splint on. Keep the splint dry.  Follow up with orthopedics next week. You should receive a call Monday to schedule. Contact the clinic at [phone] if you do not hear from them by Tuesday.   Return as needed for new or worsening symptoms.

## 2018-10-01 ENCOUNTER — TELEPHONE (OUTPATIENT)
Dept: ORTHOPEDICS | Facility: CLINIC | Age: 63
End: 2018-10-01

## 2018-10-01 NOTE — TELEPHONE ENCOUNTER
Message left on patient's voicemail concerning an appointment with an orthopedic provider today.  She was seen in the ER for a right fifth metacarpal fracture 09/29/2018.  An appointment is available with Dr Berry.

## 2018-10-02 ENCOUNTER — RADIANT APPOINTMENT (OUTPATIENT)
Dept: GENERAL RADIOLOGY | Facility: CLINIC | Age: 63
End: 2018-10-02
Attending: PHYSICIAN ASSISTANT
Payer: COMMERCIAL

## 2018-10-02 ENCOUNTER — OFFICE VISIT (OUTPATIENT)
Dept: ORTHOPEDICS | Facility: CLINIC | Age: 63
End: 2018-10-02
Payer: COMMERCIAL

## 2018-10-02 VITALS
BODY MASS INDEX: 22.53 KG/M2 | DIASTOLIC BLOOD PRESSURE: 69 MMHG | HEIGHT: 64 IN | HEART RATE: 84 BPM | SYSTOLIC BLOOD PRESSURE: 105 MMHG | OXYGEN SATURATION: 94 % | WEIGHT: 132 LBS

## 2018-10-02 DIAGNOSIS — S62.357A CLOSED NONDISPLACED FRACTURE OF SHAFT OF FIFTH METACARPAL BONE OF LEFT HAND, INITIAL ENCOUNTER: Primary | ICD-10-CM

## 2018-10-02 DIAGNOSIS — S62.357A CLOSED NONDISPLACED FRACTURE OF SHAFT OF FIFTH METACARPAL BONE OF LEFT HAND, INITIAL ENCOUNTER: ICD-10-CM

## 2018-10-02 PROBLEM — S62.356A CLOSED NONDISPLACED FRACTURE OF SHAFT OF FIFTH METACARPAL BONE OF RIGHT HAND: Status: RESOLVED | Noted: 2018-10-02 | Resolved: 2018-10-02

## 2018-10-02 PROBLEM — S62.356A CLOSED NONDISPLACED FRACTURE OF SHAFT OF FIFTH METACARPAL BONE OF RIGHT HAND: Status: ACTIVE | Noted: 2018-10-02

## 2018-10-02 PROCEDURE — 73130 X-RAY EXAM OF HAND: CPT | Mod: LT

## 2018-10-02 PROCEDURE — 26600 TREAT METACARPAL FRACTURE: CPT | Mod: F4 | Performed by: ORTHOPAEDIC SURGERY

## 2018-10-02 NOTE — PROGRESS NOTES
Applied a short arm cast with anterior to posterior pressure on the 5th metacarpal head to patient's left wrist. Capillary refill was satisfactory. Educated patient on cast care and warning signs/symptoms.    Lonny Moran PA-C  Supervising physician: Maximilian Weeks MD  Dept. of Orthopedics  [facility]

## 2018-10-02 NOTE — MR AVS SNAPSHOT
After Visit Summary   10/2/2018    Shruti Gifford    MRN: [id number]           Patient Information     Date Of Birth          1955        Visit Information        Provider Department      10/2/2018 1:45 PM Maximilian Weeks MD [facility]        Today's Diagnoses     Closed nondisplaced fracture of shaft of fifth metacarpal bone of left hand, initial encounter    -  1      Care Instructions    Cast care instructions given to patient today includin.  Keep cast clean and dry:  When showering/bathing use plastic bag or other impervious barrier to keep cast dry.  Moisture from sweat is normal.  The more clean and dry you keep your cast the less it will itch and the less it will smell.  If the cast does get soaked, you can call to request a cast change, but realize it may be 1-2 days before the doctor's office can do a cast change.  You can also use a hair dryer on the low setting, but this will take several hours to dry completely.  If itching occurs, do not stick anything down the cast, as this may result in skin breakdown and infection.  You may use a can of compressed air (with no additives) to relieve itching.    2.  Elevate extremity / affected area to reduce swelling:  The cast will not expand and contract the same way the splint did, therefore it will be especially important to elevate the injured area above heart level to reduce swelling, especially during the next 24-48 hours.  If swelling continues and produces tingling and numbness that is not relieved by elevation, schedule an appointment with Dr. Maximilian Weeks's office, ER, or Urgent care to get cast adjusted.    3.  Make it comfortable:  Since the fiberglass on your cast may fray during application, feel free to make minor modifications to your cast to reduce the amount of irritation to your skin.  This is especially important around the thumb area of the cast.  It may be helpful to use a nail file  or small shop file to smooth sharp areas on the cast.  You may also find it helpful to pad the base of the thumb with a band-aid or piece of tape.  You may not cut large sections off of your cast.   ** For additional questions call [phone]  **  ** PATIENT VERBALIZED UNDERSTANDING OF ABOVE INSTRUCTIONS  **            Follow-ups after your visit        Your next 10 appointments already scheduled     Oct 02, 2018  2:05 PM CDT   XR HAND LEFT G/E 3 VIEWS with CPXR1   [facility] ([facility]    [address]  [place]. \[facility].\"" [id number] [phone]           How do I prepare for my exam? (Food and drink instructions) No Food and Drink Restrictions.  How do I prepare for my exam? (Other instructions) You do not need to do anything special for this exam.  What should I wear: Wear comfortable clothes.  How long does the exam take: Most scans take less than 5 minutes.  What should I bring: Bring a list of your medicines, including vitamins, minerals and over-the-counter drugs. It is safest to leave personal items at home.  Do I need a :  No  is needed.  What do I need to tell my doctor: Tell your doctor if there s any chance you are pregnant.  What should I do after the exam: No restrictions, You may resume normal activities.  What is this test: An image of a specific body part shown in shades of black and white.  Who should I call with questions: If you have any questions, please call the Imaging Department where you will have your exam. Directions, parking instructions, and other information is available on our website, [email or web address].              Who to contact     If you have questions or need follow up information about today's clinic visit or your schedule please contact [facility] directly at [phone].  Normal or non-critical lab and imaging results will be communicated to you by MyChart, letter or phone within  "4 business days after the clinic has received the results. If you do not hear from us within 7 days, please contact the clinic through [facility] or phone. If you have a critical or abnormal lab result, we will notify you by phone as soon as possible.  Submit refill requests through [employer] or call your pharmacy and they will forward the refill request to us. Please allow 3 business days for your refill to be completed.          Additional Information About Your Visit        [employer] Information     [employer] gives you secure access to your electronic health record. If you see a primary care provider, you can also send messages to your care team and make appointments. If you have questions, please call your primary care clinic.  If you do not have a primary care provider, please call [phone] and they will assist you.        Care EveryWhere ID     This is your Care EveryWhere ID. This could be used by other organizations to access your [place] medical records  [id number]        Your Vitals Were     Pulse Height Pulse Oximetry BMI (Body Mass Index)          84 1.613 m (5' 3.5\") 94% 23.02 kg/m2         Blood Pressure from Last 3 Encounters:   10/02/18 105/69   09/29/18 (!) 155/93   05/21/18 98/68    Weight from Last 3 Encounters:   10/02/18 59.9 kg (132 lb)   05/21/18 59.9 kg (132 lb)   06/12/17 54.4 kg (120 lb)                 Today's Medication Changes          These changes are accurate as of 10/2/18  2:04 PM.  If you have any questions, ask your nurse or doctor.               These medicines have changed or have updated prescriptions.        Dose/Directions    lamoTRIgine 100 MG tablet   Commonly known as:  LaMICtal   This may have changed:    - when to take this  - additional instructions   Used for:  Major depressive disorder, recurrent episode, moderate (H)        Take 1 per day for a week, then 1 and 1/2 per day for a week, then 2 per day   Quantity:  60 tablet   Refills:  6                Primary Care " Provider Office Phone # Fax #    Dick Diaz [phone]       [address] [facility] [address]        Equal Access to Services     CISCO BARRETT : Hadbrynn katie chino vazquezo Sofranali, waaxda luqadaha, qaybta kaalmada aderadhames, vel rodriguez laSimonetheresa hughes. So [facility] [phone].    ATENCIÓN: Si habla español, tiene a rader disposición servicios gratuitos de asistencia lingüística. Llame al [phone].    We comply with applicable federal civil rights laws and [place] laws. We do not discriminate on the basis of race, color, national origin, age, disability, sex, sexual orientation, or gender identity.            Thank you!     Thank you for choosing [facility]  for your care. Our goal is always to provide you with excellent care. Hearing back from our patients is one way we can continue to improve our services. Please take a few minutes to complete the written survey that you may receive in the mail after your visit with us. Thank you!             Your Updated Medication List - Protect others around you: Learn how to safely use, store and throw away your medicines at www.disposemymeds.org.          This list is accurate as of 10/2/18  2:04 PM.  Always use your most recent med list.                   Brand Name Dispense Instructions for use Diagnosis    ALEVE PO      Take by mouth as needed for moderate pain        aspirin 81 MG tablet      Take 1 tablet by mouth daily.        buPROPion 150 MG 12 hr tablet    BUPROBAN    90 tablet    Take 1 tablet (150 mg) by mouth daily as needed    Major depressive disorder, recurrent episode, moderate (H)       citalopram 20 MG tablet    celeXA    90 tablet    Take 1 tablet (20 mg) by mouth daily    Major depressive disorder, recurrent episode, moderate (H)       EX-LAX PO      Take by mouth as needed        lamoTRIgine 100 MG tablet    LaMICtal    60 tablet    Take 1 per day for a week, then 1 and 1/2 per day  for a week, then 2 per day    Major depressive disorder, recurrent episode, moderate (H)       levothyroxine 175 MCG tablet    SYNTHROID/LEVOTHROID    90 tablet    TAKE 1 TABLET BY MOUTH EVERY DAY    Hypothyroidism, unspecified type       METAMUCIL PO      Take by mouth daily        sertraline 100 MG tablet    ZOLOFT    30 tablet    TAKE 1 TABLET (100 MG) BY MOUTH DAILY    Major depressive disorder, recurrent episode, moderate (H)       sodium chloride 0.65 % nasal spray    OCEAN    1 Bottle    Spray 1 spray in nostril every hour as needed for congestion (dry nares ).    Nasal fracture, Facial laceration       traZODone 50 MG tablet    DESYREL    60 tablet    TAKE 2 TABLETS BY MOUTH NIGHTLY AS NEEDED FOR SLEEP    Major depressive disorder, recurrent episode, moderate (H)

## 2018-10-02 NOTE — LETTER
"    10/2/2018         RE: Shruti Gifford  [address] [facility] [address]        Dear Colleague,    Thank you for referring your patient, Shruti Gifford, to the [facility]. Please see a copy of my visit note below.    Applied a short arm cast with anterior to posterior pressure on the 5th metacarpal head to patient's left wrist. Capillary refill was satisfactory. Educated patient on cast care and warning signs/symptoms.    Lonny Moran PA-C  Supervising physician: Maximilian Weeks MD  Dept. of Orthopedics  [facility]            SUBJECTIVE:  Shruti Gifford is a 63 year old female who sustained a left hand injury 18 . Mechanism of injury: she fell off a step ladder bumping a 42\" TV onto the floor and her hand.  She broke the TV and her hand.. Immediate symptoms: immediate pain, immediate swelling, inability to use hand directly after injury. She was placed into a splint out past the fingers.  Symptoms have been unchanged since that time. Prior history of related problems: no prior problems with this area in the past.    Past Medical History:   Diagnosis Date     Allergy history unknown      Anxiety      Arthritis      Bipolar affective disorder (H)      Broken bones     nose     Cerebral infarction (H) 01     Depressive disorder      Facial trauma     facial cuts or scares     Head trauma      Hypothyroidism      Radiation      Stroke (H)      Thyroid disease        Past Surgical History:   Procedure Laterality Date     ABDOMEN SURGERY      approximate year of part. hyster     CARPAL TUNNEL RELEASE RT/LT        SECTION       CLOSED REDUCTION, PERCUTANEOUS PINNING FINGER, COMBINED  2012    Procedure:COMBINED CLOSED REDUCTION, PERCUTANEOUS PINNING FINGER; Closed Reduction Internal Fixation and Percutaneous Pinning Right Small Finger.; Surgeon:SERGIO MARTINEZ; [facility]     CRANIOTOMY  2012    Procedure:CRANIOTOMY; " Elevation of compressed right temporal skull fracture; Surgeon:BOO ABDI; [facility]     ENT SURGERY  6/2011    broken nose     GYN SURGERY  1993     HEAD & NECK SURGERY  1/28/2012     HYSTERECTOMY       HYSTERECTOMY, PAP NO LONGER INDICATED       OPEN REDUCTION INTERNAL FIXATION WRIST  2/6/2013    Procedure: OPEN REDUCTION INTERNAL FIXATION WRIST;  Open Reduction Internal Fixation Right Distal Radius Fracture ;  Surgeon: Kathryn Topete MD;  [facility]     ORTHOPEDIC SURGERY  2/2012     TONSILLECTOMY & ADENOIDECTOMY         Family History   Problem Relation Age of Onset     Other Cancer Mother      Coronary Artery Disease Father      Depression Brother      Depression Sister      Depression Sister      Anxiety Disorder Daughter      Coronary Artery Disease Sister      Obesity Sister      Anesthesia Reaction Daughter      Thyroid Disease Daughter        Social History     Social History     Marital status:      Spouse name: N/A     Number of children: N/A     Years of education: N/A     Occupational History     Not on file.     Social History Main Topics     Smoking status: Current Every Day Smoker     Packs/day: 0.50     Years: 34.00     Types: Cigarettes     Start date: 6/1/1980     Smokeless tobacco: Never Used     Alcohol use Yes      Comment: rare- 2 drinks/ month     Drug use: No     Sexual activity: Yes     Partners: Male     Birth control/ protection: Female Surgical     Other Topics Concern     Parent/Sibling W/ Cabg, Mi Or Angioplasty Before 65f 55m? No     Social History Narrative       Current Outpatient Prescriptions   Medication Sig Dispense Refill     aspirin 81 MG tablet Take 1 tablet by mouth daily.       buPROPion (BUPROBAN) 150 MG 12 hr tablet Take 1 tablet (150 mg) by mouth daily as needed 90 tablet 3     citalopram (CELEXA) 20 MG tablet Take 1 tablet (20 mg) by mouth daily 90 tablet 3     lamoTRIgine (LAMICTAL) 100 MG tablet Take 1 per day for a week, then 1 and  "1/2 per day for a week, then 2 per day (Patient taking differently: 2 times daily Take 1 per day for a week, then 1 and 1/2 per day for a week, then 2 per day) 60 tablet 6     levothyroxine (SYNTHROID/LEVOTHROID) 175 MCG tablet TAKE 1 TABLET BY MOUTH EVERY DAY 90 tablet 1     Naproxen Sodium (ALEVE PO) Take by mouth as needed for moderate pain       Psyllium (METAMUCIL PO) Take by mouth daily       Sennosides (EX-LAX PO) Take by mouth as needed       sertraline (ZOLOFT) 100 MG tablet TAKE 1 TABLET (100 MG) BY MOUTH DAILY 30 tablet 0     sodium chloride (OCEAN) 0.65 % nasal spray Spray 1 spray in nostril every hour as needed for congestion (dry nares ). 1 Bottle 0     traZODone (DESYREL) 50 MG tablet TAKE 2 TABLETS BY MOUTH NIGHTLY AS NEEDED FOR SLEEP 60 tablet 5       Allergies   Allergen Reactions     Codeine Sulfate      Hives     Flagyl [Metronidazole]      \"Head goes numb\"     Gabapentin Hives     Zyprexa Hives     Latex Rash     If wears latex gloves for an extended period of time develops a rash       REVIEW OF SYSTEMS:  CONSTITUTIONAL:  NEGATIVE for fever, chills, change in weight, not feeling tired  SKIN:  NEGATIVE for worrisome rashes, no skin lumps, no skin ulcers and no non-healing wounds  EYES:  NEGATIVE for vision changes or irritation.  ENT/MOUTH:  NEGATIVE.  No hearing loss, no hoarseness, no difficulty swallowing.  RESP:  NEGATIVE. No cough or shortness of breath.  BREAST:  NEGATIVE for masses, tenderness or discharge  CV:  NEGATIVE for chest pain, palpitations or peripheral edema  GI:  NEGATIVE for nausea, abdominal pain, heartburn, or change in bowel habits  :  Negative. No dysuria, no hematuria  MUSCULOSKELETAL:  See HPI above  NEURO:  NEGATIVE . No headaches, no dizziness,  no numbness  ENDOCRINE:  NEGATIVE for temperature intolerance, skin/hair changes  HEME/ALLERGY/IMMUNE:  NEGATIVE for bleeding problems  PSYCHIATRIC:  NEGATIVE. no anxiety, no depression.      Exam:  Vitals: /69  Pulse " "84  Ht 1.613 m (5' 3.5\")  Wt 59.9 kg (132 lb)  SpO2 94%  BMI 23.02 kg/m2  BMI= Body mass index is 23.02 kg/(m^2).  Constitutional:  healthy, alert and no distress  Neuro: Alert and Oriented x 3, Sensation grossly WNL.  Hand exam: soft tissue tenderness and swelling at the left 5th metacarpal , radial pulse normal, sensation normal.  Positive bruising of the left palm.  No abnormal finger rotation.    X-ray: fracture of left 5th metacarpal shaft - spiral with mild flexion..    ASSESSMENT:  hand fracture - left 5th metacarpal shaft.    PLAN:  Short arm cast applied with slight extension mold.  X-ray shows mild increased varus bend.  Return to clinic 5-6 weeks with x-ray hand out of cast.      Maximilian Weeks M.D.  Department of Orthopaedic Surgery  [facility]    Again, thank you for allowing me to participate in the care of your patient.        Sincerely,        Maximilian Weeks MD    "

## 2018-10-02 NOTE — PROGRESS NOTES
"SUBJECTIVE:  Shruti Gifford is a 63 year old female who sustained a left hand injury 18 . Mechanism of injury: she fell off a step ladder bumping a 42\" TV onto the floor and her hand.  She broke the TV and her hand.. Immediate symptoms: immediate pain, immediate swelling, inability to use hand directly after injury. She was placed into a splint out past the fingers.  Symptoms have been unchanged since that time. Prior history of related problems: no prior problems with this area in the past.    Past Medical History:   Diagnosis Date     Allergy history unknown      Anxiety      Arthritis      Bipolar affective disorder (H)      Broken bones     nose     Cerebral infarction (H) 01     Depressive disorder      Facial trauma     facial cuts or scares     Head trauma      Hypothyroidism      Radiation      Stroke (H)      Thyroid disease        Past Surgical History:   Procedure Laterality Date     ABDOMEN SURGERY      approximate year of part. hyster     CARPAL TUNNEL RELEASE RT/LT        SECTION       CLOSED REDUCTION, PERCUTANEOUS PINNING FINGER, COMBINED  2012    Procedure:COMBINED CLOSED REDUCTION, PERCUTANEOUS PINNING FINGER; Closed Reduction Internal Fixation and Percutaneous Pinning Right Small Finger.; Surgeon:KATHRYN TOPETE; [facility]     CRANIOTOMY  2012    Procedure:CRANIOTOMY; Elevation of compressed right temporal skull fracture; Surgeon:BOO ABDI; [facility]     ENT SURGERY  2011    broken nose     GYN SURGERY       HEAD & NECK SURGERY  2012     HYSTERECTOMY       HYSTERECTOMY, PAP NO LONGER INDICATED       OPEN REDUCTION INTERNAL FIXATION WRIST  2013    Procedure: OPEN REDUCTION INTERNAL FIXATION WRIST;  Open Reduction Internal Fixation Right Distal Radius Fracture ;  Surgeon: Kathryn Topete MD;  Location:  OR     ORTHOPEDIC SURGERY  2012     TONSILLECTOMY & ADENOIDECTOMY         Family History   Problem Relation Age of Onset "     Other Cancer Mother      Coronary Artery Disease Father      Depression Brother      Depression Sister      Depression Sister      Anxiety Disorder Daughter      Coronary Artery Disease Sister      Obesity Sister      Anesthesia Reaction Daughter      Thyroid Disease Daughter        Social History     Social History     Marital status:      Spouse name: N/A     Number of children: N/A     Years of education: N/A     Occupational History     Not on file.     Social History Main Topics     Smoking status: Current Every Day Smoker     Packs/day: 0.50     Years: 34.00     Types: Cigarettes     Start date: 6/1/1980     Smokeless tobacco: Never Used     Alcohol use Yes      Comment: rare- 2 drinks/ month     Drug use: No     Sexual activity: Yes     Partners: Male     Birth control/ protection: Female Surgical     Other Topics Concern     Parent/Sibling W/ Cabg, Mi Or Angioplasty Before 65f 55m? No     Social History Narrative       Current Outpatient Prescriptions   Medication Sig Dispense Refill     aspirin 81 MG tablet Take 1 tablet by mouth daily.       buPROPion (BUPROBAN) 150 MG 12 hr tablet Take 1 tablet (150 mg) by mouth daily as needed 90 tablet 3     citalopram (CELEXA) 20 MG tablet Take 1 tablet (20 mg) by mouth daily 90 tablet 3     lamoTRIgine (LAMICTAL) 100 MG tablet Take 1 per day for a week, then 1 and 1/2 per day for a week, then 2 per day (Patient taking differently: 2 times daily Take 1 per day for a week, then 1 and 1/2 per day for a week, then 2 per day) 60 tablet 6     levothyroxine (SYNTHROID/LEVOTHROID) 175 MCG tablet TAKE 1 TABLET BY MOUTH EVERY DAY 90 tablet 1     Naproxen Sodium (ALEVE PO) Take by mouth as needed for moderate pain       Psyllium (METAMUCIL PO) Take by mouth daily       Sennosides (EX-LAX PO) Take by mouth as needed       sertraline (ZOLOFT) 100 MG tablet TAKE 1 TABLET (100 MG) BY MOUTH DAILY 30 tablet 0     sodium chloride (OCEAN) 0.65 % nasal spray Spray 1 spray in  "nostril every hour as needed for congestion (dry nares ). 1 Bottle 0     traZODone (DESYREL) 50 MG tablet TAKE 2 TABLETS BY MOUTH NIGHTLY AS NEEDED FOR SLEEP 60 tablet 5       Allergies   Allergen Reactions     Codeine Sulfate      Hives     Flagyl [Metronidazole]      \"Head goes numb\"     Gabapentin Hives     Zyprexa Hives     Latex Rash     If wears latex gloves for an extended period of time develops a rash       REVIEW OF SYSTEMS:  CONSTITUTIONAL:  NEGATIVE for fever, chills, change in weight, not feeling tired  SKIN:  NEGATIVE for worrisome rashes, no skin lumps, no skin ulcers and no non-healing wounds  EYES:  NEGATIVE for vision changes or irritation.  ENT/MOUTH:  NEGATIVE.  No hearing loss, no hoarseness, no difficulty swallowing.  RESP:  NEGATIVE. No cough or shortness of breath.  BREAST:  NEGATIVE for masses, tenderness or discharge  CV:  NEGATIVE for chest pain, palpitations or peripheral edema  GI:  NEGATIVE for nausea, abdominal pain, heartburn, or change in bowel habits  :  Negative. No dysuria, no hematuria  MUSCULOSKELETAL:  See HPI above  NEURO:  NEGATIVE . No headaches, no dizziness,  no numbness  ENDOCRINE:  NEGATIVE for temperature intolerance, skin/hair changes  HEME/ALLERGY/IMMUNE:  NEGATIVE for bleeding problems  PSYCHIATRIC:  NEGATIVE. no anxiety, no depression.      Exam:  Vitals: /69  Pulse 84  Ht 1.613 m (5' 3.5\")  Wt 59.9 kg (132 lb)  SpO2 94%  BMI 23.02 kg/m2  BMI= Body mass index is 23.02 kg/(m^2).  Constitutional:  healthy, alert and no distress  Neuro: Alert and Oriented x 3, Sensation grossly WNL.  Hand exam: soft tissue tenderness and swelling at the left 5th metacarpal , radial pulse normal, sensation normal.  Positive bruising of the left palm.  No abnormal finger rotation.    X-ray: fracture of left 5th metacarpal shaft - spiral with mild flexion..    ASSESSMENT:  hand fracture - left 5th metacarpal shaft.    PLAN:  Short arm cast applied with slight extension " mold.  X-ray shows mild increased varus bend.  Return to clinic 5-6 weeks with x-ray hand out of cast.      Maximilian Weeks M.D.  Department of Orthopaedic Surgery  [facility]

## 2018-10-02 NOTE — PATIENT INSTRUCTIONS
Cast care instructions given to patient today includin.  Keep cast clean and dry:  When showering/bathing use plastic bag or other impervious barrier to keep cast dry.  Moisture from sweat is normal.  The more clean and dry you keep your cast the less it will itch and the less it will smell.  If the cast does get soaked, you can call to request a cast change, but realize it may be 1-2 days before the doctor's office can do a cast change.  You can also use a hair dryer on the low setting, but this will take several hours to dry completely.  If itching occurs, do not stick anything down the cast, as this may result in skin breakdown and infection.  You may use a can of compressed air (with no additives) to relieve itching.    2.  Elevate extremity / affected area to reduce swelling:  The cast will not expand and contract the same way the splint did, therefore it will be especially important to elevate the injured area above heart level to reduce swelling, especially during the next 24-48 hours.  If swelling continues and produces tingling and numbness that is not relieved by elevation, schedule an appointment with Dr. Maximilian Weeks's office, ER, or Urgent care to get cast adjusted.    3.  Make it comfortable:  Since the fiberglass on your cast may fray during application, feel free to make minor modifications to your cast to reduce the amount of irritation to your skin.  This is especially important around the thumb area of the cast.  It may be helpful to use a nail file or small shop file to smooth sharp areas on the cast.  You may also find it helpful to pad the base of the thumb with a band-aid or piece of tape.  You may not cut large sections off of your cast.   ** For additional questions call [phone]  **  ** PATIENT VERBALIZED UNDERSTANDING OF ABOVE INSTRUCTIONS  **

## 2018-10-23 DIAGNOSIS — F33.1 MAJOR DEPRESSIVE DISORDER, RECURRENT EPISODE, MODERATE (H): ICD-10-CM

## 2018-10-23 RX ORDER — TRAZODONE HYDROCHLORIDE 50 MG/1
100 TABLET, FILM COATED ORAL AT BEDTIME
Qty: 60 TABLET | Refills: 5 | Status: SHIPPED | OUTPATIENT
Start: 2018-10-23 | End: 2019-04-27

## 2018-10-23 NOTE — TELEPHONE ENCOUNTER
Requested Prescriptions   Pending Prescriptions Disp Refills     traZODone (DESYREL) 50 MG tablet 60 tablet 5    There is no refill protocol information for this order        Last refill 5/21/18  Last OV 5/21/18.    PHQ-9 score:    PHQ-9 SCORE 5/21/2018   Total Score -   Total Score MyChart -   Total Score 5     Routing refill request to provider for review/approval because:  PHQ 9 = 5.    Juliette Duran RN [facility] Triage.

## 2018-10-23 NOTE — TELEPHONE ENCOUNTER
Reason for Call:  Medication or medication refill:    Do you use a [facility]?  Name of the pharmacy and phone number for the current request:  [facility], [facility]    Name of the medication requested: traZODone (DESYREL)    Other request: Patient is requesting that the refill be ready today since she ran out of medication since Friday.      Can we leave a detailed message on this number? YES    Phone number patient can be reached at: Cell number on file:    Telephone Information:   Mobile [phone]       Best Time: Anytime    Call taken on 10/23/2018 at 1:37 PM by Zulay Nguyễn

## 2018-10-29 DIAGNOSIS — F33.1 MAJOR DEPRESSIVE DISORDER, RECURRENT EPISODE, MODERATE (H): ICD-10-CM

## 2018-10-29 RX ORDER — LAMOTRIGINE 100 MG/1
100 TABLET ORAL 2 TIMES DAILY
Qty: 60 TABLET | Refills: 1 | Status: SHIPPED | OUTPATIENT
Start: 2018-10-29 | End: 2018-11-30

## 2018-10-29 NOTE — TELEPHONE ENCOUNTER
Patient was last seen by Dr. Sarmiento in January of 2018.  She was to return to clinic in 6 weeks or so.  She did not return and provider resigned in March 2018.      Refill request received for Lamictal 100 mg tabs.    RN attempted outreach to patient. Voicemail left.  Will route to primary care for review.

## 2018-10-29 NOTE — TELEPHONE ENCOUNTER
I have refilled the medication   She needs labs done and these orders have been placed   She should have a follow up with another psychiatrist or me before the next refill   She should have a blood level before her her dose (trough level)

## 2018-11-20 ENCOUNTER — RADIANT APPOINTMENT (OUTPATIENT)
Dept: GENERAL RADIOLOGY | Facility: CLINIC | Age: 63
End: 2018-11-20
Attending: ORTHOPAEDIC SURGERY
Payer: COMMERCIAL

## 2018-11-20 ENCOUNTER — OFFICE VISIT (OUTPATIENT)
Dept: ORTHOPEDICS | Facility: CLINIC | Age: 63
End: 2018-11-20
Payer: COMMERCIAL

## 2018-11-20 VITALS
HEART RATE: 74 BPM | RESPIRATION RATE: 13 BRPM | WEIGHT: 130 LBS | SYSTOLIC BLOOD PRESSURE: 127 MMHG | OXYGEN SATURATION: 91 % | HEIGHT: 64 IN | DIASTOLIC BLOOD PRESSURE: 74 MMHG | BODY MASS INDEX: 22.2 KG/M2

## 2018-11-20 DIAGNOSIS — S62.357D CLOSED NONDISPLACED FRACTURE OF SHAFT OF FIFTH METACARPAL BONE OF LEFT HAND WITH ROUTINE HEALING, SUBSEQUENT ENCOUNTER: Primary | ICD-10-CM

## 2018-11-20 DIAGNOSIS — S62.357D CLOSED NONDISPLACED FRACTURE OF SHAFT OF FIFTH METACARPAL BONE OF LEFT HAND WITH ROUTINE HEALING, SUBSEQUENT ENCOUNTER: ICD-10-CM

## 2018-11-20 PROCEDURE — 73130 X-RAY EXAM OF HAND: CPT | Mod: LT

## 2018-11-20 PROCEDURE — 99207 ZZC FRACTURE CARE IN GLOBAL PERIOD: CPT | Performed by: ORTHOPAEDIC SURGERY

## 2018-11-20 NOTE — PROGRESS NOTES
Patient arrived with a short arm cast on left arm. Cast was removed with cast saw and skin cleaned with rubbing alcohol.    JAYSON VieraC  Supervising physician: Maximilian Weeks MD  Dept. of Orthopedics  [facility]

## 2018-11-20 NOTE — PROGRESS NOTES
Follow-up left fifth metacarpal fracture sustained 9/29/18.  Cast is removed today.  There is no tenderness to direct palpation of the fracture. No pain with longitudinal compression. There is some pain with bending force applied.  She has nearly full range of motion of the fingers.    X-ray shows very sparse callus formation at the fifth metacarpal.    Assessment:  Left 5th metacarpal fracture with early healing.  Plan:  It appears healed well enough to leave out of the cast. It is not ready for impact.  Gentle range of motion allowed. No aggressive use.  Return to clinic 4-5 weeks with repeat x-ray left hand.

## 2018-11-20 NOTE — MR AVS SNAPSHOT
After Visit Summary   11/20/2018    Shruti Gifford    MRN: [id number]           Patient Information     Date Of Birth          1955        Visit Information        Provider Department      11/20/2018 2:30 PM Maximilian Weeks MD [facility]        Today's Diagnoses     Closed nondisplaced fracture of shaft of fifth metacarpal bone of left hand with routine healing, subsequent encounter    -  1      Care Instructions    Gentle use of hand ok.  Avoid impact.  Return to clinic 4-5 weeks for x-ray.          Follow-ups after your visit        Your next 10 appointments already scheduled     Nov 23, 2018  9:00 AM CST   LAB with CP LAB   [facility]    [address] [place] [id number] [phone]           Please do not eat 10-12 hours before your appointment if you are coming in fasting for labs on lipids, cholesterol, or glucose (sugar). This does not apply to pregnant women. Water, hot tea and black coffee (with nothing added) are okay. Do not drink other fluids, diet soda or chew gum.            Nov 30, 2018  9:00 AM CST   Office Visit with Dick Diaz MD   [facility]    [address] [place] [address] [phone]           Bring a current list of meds and any records pertaining to this visit. For Physicals, please bring immunization records and any forms needing to be filled out. Please arrive 10 minutes early to complete paperwork.              Who to contact     If you have questions or need follow up information about today's clinic visit or your schedule please contact [facility] directly at [phone].  Normal or non-critical lab and imaging results will be communicated to you by MyChart, letter or phone within 4 business days after the clinic has received  "the results. If you do not hear from us within 7 days, please contact the clinic through iLike or phone. If you have a critical or abnormal lab result, we will notify you by phone as soon as possible.  Submit refill requests through iLike or call your pharmacy and they will forward the refill request to us. Please allow 3 business days for your refill to be completed.          Additional Information About Your Visit        [employer] ([employer]) Information     iLike gives you secure access to your electronic health record. If you see a primary care provider, you can also send messages to your care team and make appointments. If you have questions, please call your primary care clinic.  If you do not have a primary care provider, please call [phone] and they will assist you.        Care EveryWhere ID     This is your Care EveryWhere ID. This could be used by other organizations to access your [place] medical records  [id number]        Your Vitals Were     Pulse Respirations Height Pulse Oximetry BMI (Body Mass Index)       74 13 1.613 m (5' 3.5\") 91% 22.67 kg/m2        Blood Pressure from Last 3 Encounters:   11/20/18 127/74   10/02/18 105/69   09/29/18 (!) 155/93    Weight from Last 3 Encounters:   11/20/18 59 kg (130 lb)   10/02/18 59.9 kg (132 lb)   05/21/18 59.9 kg (132 lb)               Primary Care Provider Office Phone # Fax #    Dick Diaz [phone]       [address] [id number]        Equal Access to Services     [facility] : Hadii aad ku hadasho Soomaali, waaxda luqadaha, qaybta kaalmada adeegyada, vel bagley . So [facility] [phone].    ATENCIÓN: Si habla español, tiene a rader disposición servicios gratuitos de asistencia lingüística. Llame al [phone].    We comply with applicable federal civil rights laws and [place] laws. We do not discriminate on the basis of race, color, national origin, age, disability, sex, sexual " orientation, or gender identity.            Thank you!     Thank you for choosing [facility]  for your care. Our goal is always to provide you with excellent care. Hearing back from our patients is one way we can continue to improve our services. Please take a few minutes to complete the written survey that you may receive in the mail after your visit with us. Thank you!             Your Updated Medication List - Protect others around you: Learn how to safely use, store and throw away your medicines at www.disposemymeds.org.          This list is accurate as of 11/20/18  2:55 PM.  Always use your most recent med list.                   Brand Name Dispense Instructions for use Diagnosis    ALEVE PO      Take by mouth as needed for moderate pain        aspirin 81 MG tablet      Take 1 tablet by mouth daily.        buPROPion 150 MG 12 hr tablet    BUPROBAN    90 tablet    Take 1 tablet (150 mg) by mouth daily as needed    Major depressive disorder, recurrent episode, moderate (H)       citalopram 20 MG tablet    celeXA    90 tablet    Take 1 tablet (20 mg) by mouth daily    Major depressive disorder, recurrent episode, moderate (H)       EX-LAX PO      Take by mouth as needed        lamoTRIgine 100 MG tablet    LaMICtal    60 tablet    Take 1 tablet (100 mg) by mouth 2 times daily    Major depressive disorder, recurrent episode, moderate (H)       levothyroxine 175 MCG tablet    SYNTHROID/LEVOTHROID    90 tablet    TAKE 1 TABLET BY MOUTH EVERY DAY    Hypothyroidism, unspecified type       METAMUCIL PO      Take by mouth daily        sertraline 100 MG tablet    ZOLOFT    30 tablet    TAKE 1 TABLET (100 MG) BY MOUTH DAILY    Major depressive disorder, recurrent episode, moderate (H)       sodium chloride 0.65 % nasal spray    OCEAN    1 Bottle    Spray 1 spray in nostril every hour as needed for congestion (dry nares ).    Nasal fracture, Facial laceration       traZODone 50 MG tablet    DESYREL     60 tablet    Take 2 tablets (100 mg) by mouth At Bedtime    Major depressive disorder, recurrent episode, moderate (H)

## 2018-11-20 NOTE — LETTER
11/20/2018         RE: Shruti Gifford  [address] [address]        Dear Colleague,    Thank you for referring your patient, Shruti Gifford, to the [facility]. Please see a copy of my visit note below.    Patient arrived with a short arm cast on left arm. Cast was removed with cast saw and skin cleaned with rubbing alcohol.    JAYSON VieraC  Supervising physician: Maximilian Weeks MD  Dept. of Orthopedics  [facility]            Follow-up left fifth metacarpal fracture sustained 9/29/18.  Cast is removed today.  There is no tenderness to direct palpation of the fracture. No pain with longitudinal compression. There is some pain with bending force applied.  She has nearly full range of motion of the fingers.    X-ray shows very sparse callus formation at the fifth metacarpal.    Assessment:  Left 5th metacarpal fracture with early healing.  Plan:  It appears healed well enough to leave out of the cast. It is not ready for impact.  Gentle range of motion allowed. No aggressive use.  Return to clinic 4-5 weeks with repeat x-ray left hand.    Again, thank you for allowing me to participate in the care of your patient.        Sincerely,        Maximilian Weeks MD

## 2018-11-23 DIAGNOSIS — F33.1 MAJOR DEPRESSIVE DISORDER, RECURRENT EPISODE, MODERATE (H): ICD-10-CM

## 2018-11-23 DIAGNOSIS — E03.4 HYPOTHYROIDISM DUE TO ACQUIRED ATROPHY OF THYROID: ICD-10-CM

## 2018-11-23 LAB
ALT SERPL W P-5'-P-CCNC: 21 U/L (ref 0–50)
AST SERPL W P-5'-P-CCNC: 17 U/L (ref 0–45)
T4 FREE SERPL-MCNC: 1.76 NG/DL (ref 0.76–1.46)
TSH SERPL DL<=0.005 MIU/L-ACNC: 0.09 MU/L (ref 0.4–4)

## 2018-11-23 PROCEDURE — 84450 TRANSFERASE (AST) (SGOT): CPT | Performed by: FAMILY MEDICINE

## 2018-11-23 PROCEDURE — 84460 ALANINE AMINO (ALT) (SGPT): CPT | Performed by: FAMILY MEDICINE

## 2018-11-23 PROCEDURE — 99000 SPECIMEN HANDLING OFFICE-LAB: CPT | Performed by: FAMILY MEDICINE

## 2018-11-23 PROCEDURE — 80175 DRUG SCREEN QUAN LAMOTRIGINE: CPT | Mod: 90 | Performed by: FAMILY MEDICINE

## 2018-11-23 PROCEDURE — 36415 COLL VENOUS BLD VENIPUNCTURE: CPT | Performed by: FAMILY MEDICINE

## 2018-11-23 PROCEDURE — 84439 ASSAY OF FREE THYROXINE: CPT | Performed by: FAMILY MEDICINE

## 2018-11-23 PROCEDURE — 84443 ASSAY THYROID STIM HORMONE: CPT | Performed by: FAMILY MEDICINE

## 2018-11-24 LAB — LAMOTRIGINE SERPL-MCNC: 8.9 UG/ML (ref 2.5–15)

## 2018-11-26 ENCOUNTER — TELEPHONE (OUTPATIENT)
Dept: FAMILY MEDICINE | Facility: CLINIC | Age: 63
End: 2018-11-26

## 2018-11-26 DIAGNOSIS — E03.9 HYPOTHYROIDISM, UNSPECIFIED TYPE: ICD-10-CM

## 2018-11-26 DIAGNOSIS — E03.4 HYPOTHYROIDISM DUE TO ACQUIRED ATROPHY OF THYROID: Primary | ICD-10-CM

## 2018-11-26 RX ORDER — LEVOTHYROXINE SODIUM 150 UG/1
150 TABLET ORAL DAILY
Qty: 90 TABLET | Refills: 1 | Status: SHIPPED | OUTPATIENT
Start: 2018-11-26 | End: 2019-04-30

## 2018-11-26 NOTE — PROGRESS NOTES
Your lamotrigene level is normal   No change in the dose of the med   Liver function tests normal   It appears your thyroid is overactive   I would decrease to 150 mcg with your next refill   Recheck thyroid tests in 2 months after the change

## 2018-11-26 NOTE — TELEPHONE ENCOUNTER
Notified patient of the message below per PCP.  Patient stated understanding and agreeable with the plan of care. Juliette Duran, RN-BSN, [facility], [place] [facility].

## 2018-11-26 NOTE — TELEPHONE ENCOUNTER
Thyroid test is showing hyperactive thyroid   Need to reduce the dose of med   I have decreased to 150 mcg with the next refill   Repeat lab work 2 months after this change   Please call patient

## 2018-11-30 ENCOUNTER — OFFICE VISIT (OUTPATIENT)
Dept: FAMILY MEDICINE | Facility: CLINIC | Age: 63
End: 2018-11-30
Payer: COMMERCIAL

## 2018-11-30 VITALS
WEIGHT: 123 LBS | DIASTOLIC BLOOD PRESSURE: 56 MMHG | HEART RATE: 68 BPM | SYSTOLIC BLOOD PRESSURE: 90 MMHG | TEMPERATURE: 96.9 F | OXYGEN SATURATION: 97 % | BODY MASS INDEX: 21.45 KG/M2

## 2018-11-30 DIAGNOSIS — S06.9X0S TRAUMATIC BRAIN INJURY, WITHOUT LOSS OF CONSCIOUSNESS, SEQUELA (H): ICD-10-CM

## 2018-11-30 DIAGNOSIS — S62.357D CLOSED NONDISPLACED FRACTURE OF SHAFT OF FIFTH METACARPAL BONE OF LEFT HAND WITH ROUTINE HEALING, SUBSEQUENT ENCOUNTER: ICD-10-CM

## 2018-11-30 DIAGNOSIS — F33.1 MAJOR DEPRESSIVE DISORDER, RECURRENT EPISODE, MODERATE (H): Primary | ICD-10-CM

## 2018-11-30 PROCEDURE — 99024 POSTOP FOLLOW-UP VISIT: CPT | Performed by: FAMILY MEDICINE

## 2018-11-30 PROCEDURE — 99214 OFFICE O/P EST MOD 30 MIN: CPT | Performed by: FAMILY MEDICINE

## 2018-11-30 RX ORDER — LAMOTRIGINE 100 MG/1
100 TABLET ORAL 2 TIMES DAILY
Qty: 180 TABLET | Refills: 3 | Status: SHIPPED | OUTPATIENT
Start: 2018-11-30 | End: 2019-12-17

## 2018-11-30 ASSESSMENT — PATIENT HEALTH QUESTIONNAIRE - PHQ9: SUM OF ALL RESPONSES TO PHQ QUESTIONS 1-9: 4

## 2018-11-30 NOTE — PROGRESS NOTES
SUBJECTIVE:   Shruti Gifford is a 63 year old female who presents to clinic today for the following health issues:    Medication Followup of All    Taking Medication as prescribed: yes    Side Effects:  None    Medication Helping Symptoms:  yes       Patient was fatigued and she thinks this was because levothyroxine was too high     TSH   Date Value Ref Range Status   11/23/2018 0.09 (L) 0.40 - 4.00 mU/L Final     Current Outpatient Prescriptions   Medication Sig Dispense Refill     aspirin 81 MG tablet Take 1 tablet by mouth daily.       buPROPion (BUPROBAN) 150 MG 12 hr tablet Take 1 tablet (150 mg) by mouth daily as needed 90 tablet 3     citalopram (CELEXA) 20 MG tablet Take 1 tablet (20 mg) by mouth daily 90 tablet 3     lamoTRIgine (LAMICTAL) 100 MG tablet Take 1 tablet (100 mg) by mouth 2 times daily 60 tablet 1     levothyroxine (SYNTHROID/LEVOTHROID) 150 MCG tablet Take 1 tablet (150 mcg) by mouth daily 90 tablet 1     Naproxen Sodium (ALEVE PO) Take by mouth as needed for moderate pain       Psyllium (METAMUCIL PO) Take by mouth daily       Sennosides (EX-LAX PO) Take by mouth as needed       sertraline (ZOLOFT) 100 MG tablet TAKE 1 TABLET (100 MG) BY MOUTH DAILY 30 tablet 0     sodium chloride (OCEAN) 0.65 % nasal spray Spray 1 spray in nostril every hour as needed for congestion (dry nares ). 1 Bottle 0     traZODone (DESYREL) 50 MG tablet Take 2 tablets (100 mg) by mouth At Bedtime 60 tablet 5     Recent fracture of metacarpal   She fell off a ladder   She got dizzy   She gets dizzy a lot     She also fell going down staris   She had a TBI 7 years ago   She has light sensitivity and headaches   3-4 times a week       No longer getting therapy       O; BP 90/56 (BP Location: Right arm, Patient Position: Sitting, Cuff Size: Adult Regular)  Pulse 68  Temp 96.9  F (36.1  C) (Oral)  Wt 123 lb (55.8 kg)  SpO2 97%  Breastfeeding? No  BMI 21.45 kg/m2      Head: Normocephalic, atraumatic.  Eyes:  Conjunctiva clear, non icteric. PERRLA.  Ears: External ears and TMs normal BL.  Nose: Septum midline, nasal mucosa pink and moist. No discharge.  Mouth / Throat: Normal dentition.  No oral lesions. Pharynx non erythematous, tonsils without hypertrophy.  Neck: Supple, no enlarged LN, trachea midline.    Patient has scar along her right jaw related to her TBI     Chest wall normal to inspection and palpation. Good excursion bilaterally. Lungs clear to auscultation. Good air movement bilaterally without rales, wheezes, or rhonchi.   Regular rate and  rhythm. S1 and S2 normal, no murmurs, clicks, gallops or rubs. No edema or JVD.    The abdomen is soft without tenderness, guarding, mass, rebound or organomegaly. Bowel sounds are normal. No CVA tenderness or inguinal adenopathy noted.      Slightly unstable on feet   Grabbing wall when walking down mon   Difficulty getting off the table     Out of cast and using hand without difficulty on the fractured side   No swelling or redness     Her PHQ-9 (4)       ICD-10-CM    1. Major depressive disorder, recurrent episode, moderate (H) F33.1 lamoTRIgine (LAMICTAL) 100 MG tablet   2. Traumatic brain injury, without loss of consciousness, sequela (H) S06.9X0S    3. Closed nondisplaced fracture of shaft of fifth metacarpal bone of left hand with routine healing, subsequent encounter S62.357D      I would like to see her back in 5/2019  Follow up tsh in 2-3 months

## 2018-11-30 NOTE — MR AVS SNAPSHOT
After Visit Summary   11/30/2018    Shruti Gifford    MRN: [id number]           Patient Information     Date Of Birth          1955        Visit Information        Provider Department      11/30/2018 9:00 AM Dick Diaz MD [facility]        Today's Diagnoses     Major depressive disorder, recurrent episode, moderate (H)           Follow-ups after your visit        Follow-up notes from your care team     Return in about 5 months (around 5/1/2019) for depression.      Your next 10 appointments already scheduled     Dec 18, 2018  1:15 PM CST   Return Visit with Maximilian Weeks MD   [facility] ([facility])    [address]  [facility] [address] [phone]              Who to contact     If you have questions or need follow up information about today's clinic visit or your schedule please contact [facility] directly at [phone].  Normal or non-critical lab and imaging results will be communicated to you by MyChart, letter or phone within 4 business days after the clinic has received the results. If you do not hear from us within 7 days, please contact the clinic through [employer] or phone. If you have a critical or abnormal lab result, we will notify you by phone as soon as possible.  Submit refill requests through Livekick or call your pharmacy and they will forward the refill request to us. Please allow 3 business days for your refill to be completed.          Additional Information About Your Visit        MyChart Information     Livekick gives you secure access to your electronic health record. If you see a primary care provider, you can also send messages to your care team and make appointments. If you have questions, please call your primary care clinic.  If you do not have a primary care provider, please call [phone] and they will assist you.        Care EveryWhere  ID     This is your Care EveryWhere ID. This could be used by other organizations to access your [facility] medical records  [id number]        Your Vitals Were     Pulse Temperature Pulse Oximetry Breastfeeding? BMI (Body Mass Index)       68 96.9  F (36.1  C) (Oral) 97% No 21.45 kg/m2        Blood Pressure from Last 3 Encounters:   11/30/18 90/56   11/20/18 127/74   10/02/18 105/69    Weight from Last 3 Encounters:   11/30/18 123 lb (55.8 kg)   11/20/18 130 lb (59 kg)   10/02/18 132 lb (59.9 kg)              Today, you had the following     No orders found for display         Today's Medication Changes          These changes are accurate as of 11/30/18  9:32 AM.  If you have any questions, ask your nurse or doctor.               Stop taking these medicines if you haven't already. Please contact your care team if you have questions.     sertraline 100 MG tablet   Commonly known as:  ZOLOFT   Stopped by:  Dick Diaz MD                Where to get your medicines      These medications were sent to [facility] [address] [place], [place] - [address] [facility]  [address] [facility] [address]     Phone:  [phone]     lamoTRIgine 100 MG tablet                Primary Care Provider Office Phone # Fax #    Dick Diaz [phone]       [address] [facility] [address]        Equal Access to Services     [facility] Center[facility] : Hadii katie shuklao Sojermaine, waaxda luqadaha, qaybta kaalmada chino, waxaurea chriss hughes. So [facility] [phone].    ATENCIÓN: Si habla español, tiene a rader disposición servicios gratuitos de asistencia lingüística. Llame al [phone].    We comply with applicable federal civil rights laws and [place] laws. We do not discriminate on the basis of race, color, national origin, age, disability, sex, sexual orientation, or gender identity.            Thank you!     Thank you for choosing [facility]  for your care. Our goal is always to provide you with excellent care. Hearing back from our patients is one way we can continue to improve our services. Please take a few minutes to complete the written survey that you may receive in the mail after your visit with us. Thank you!             Your Updated Medication List - Protect others around you: Learn how to safely use, store and throw away your medicines at www.disposemymeds.org.          This list is accurate as of 11/30/18  9:32 AM.  Always use your most recent med list.                   Brand Name Dispense Instructions for use Diagnosis    ALEVE PO      Take by mouth as needed for moderate pain        aspirin 81 MG tablet    ASA     Take 1 tablet by mouth daily.        buPROPion 150 MG 12 hr tablet    BUPROBAN    90 tablet    Take 1 tablet (150 mg) by mouth daily as needed    Major depressive disorder, recurrent episode, moderate (H)       citalopram 20 MG tablet    celeXA    90 tablet    Take 1 tablet (20 mg) by mouth daily    Major depressive disorder, recurrent episode, moderate (H)       EX-LAX PO      Take by mouth as needed        lamoTRIgine 100 MG tablet    LaMICtal    180 tablet    Take 1 tablet (100 mg) by mouth 2 times daily    Major depressive disorder, recurrent episode, moderate (H)       levothyroxine 150 MCG tablet    SYNTHROID/LEVOTHROID    90 tablet    Take 1 tablet (150 mcg) by mouth daily    Hypothyroidism, unspecified type       METAMUCIL PO      Take by mouth daily        sodium chloride 0.65 % nasal spray    OCEAN    1 Bottle    Spray 1 spray in nostril every hour as needed for congestion (dry nares ).    Nasal fracture, Facial laceration       traZODone 50 MG tablet    DESYREL    60 tablet    Take 2 tablets (100 mg) by mouth At Bedtime    Major depressive disorder, recurrent episode, moderate (H)

## 2018-12-16 DIAGNOSIS — E03.9 HYPOTHYROIDISM, UNSPECIFIED TYPE: ICD-10-CM

## 2018-12-17 RX ORDER — LEVOTHYROXINE SODIUM 175 UG/1
TABLET ORAL
Qty: 90 TABLET | Refills: 1 | OUTPATIENT
Start: 2018-12-17

## 2018-12-17 NOTE — TELEPHONE ENCOUNTER
Recheck Lab in 2 months per note from 11/23/2018.    Prescription approved per [facility].  Dose change to 150 mcg.    Candis Colby RN

## 2018-12-17 NOTE — TELEPHONE ENCOUNTER
"Requested Prescriptions   Pending Prescriptions Disp Refills     levothyroxine (SYNTHROID/LEVOTHROID) 175 MCG tablet [Pharmacy Med Name: LEVOTHYROXINE 175 MCG TABLET] 90 tablet 1    Last Written Prescription Date:  11/26/18  Last Fill Quantity: 90,  # refills: 1   Last office visit: 11/30/2018 with prescribing provider:     Future Office Visit:   Next 5 appointments (look out 90 days)    Dec 18, 2018  1:15 PM CST  Return Visit with Maximilian Weeks MD  [facility] ([facility] [address]  [facility] [address]  [phone]          Sig: TAKE 1 TABLET BY MOUTH EVERY DAY    Thyroid Protocol Failed - 12/16/2018  8:39 AM       Failed - Normal TSH on file in past 12 months    Recent Labs   Lab Test 11/23/18  0845   TSH 0.09*             Passed - Patient is 12 years or older       Passed - Recent (12 mo) or future (30 days) visit within the authorizing provider's specialty    Patient had office visit in the last 12 months or has a visit in the next 30 days with authorizing provider or within the authorizing provider's specialty.  See \"Patient Info\" tab in inbasket, or \"Choose Columns\" in Meds & Orders section of the refill encounter.             Passed - No active pregnancy on record    If patient is pregnant or has had a positive pregnancy test, please check TSH.         Passed - No positive pregnancy test in past 12 months    If patient is pregnant or has had a positive pregnancy test, please check TSH.            "

## 2018-12-18 ENCOUNTER — OFFICE VISIT (OUTPATIENT)
Dept: ORTHOPEDICS | Facility: CLINIC | Age: 63
End: 2018-12-18
Payer: COMMERCIAL

## 2018-12-18 ENCOUNTER — ANCILLARY PROCEDURE (OUTPATIENT)
Dept: GENERAL RADIOLOGY | Facility: CLINIC | Age: 63
End: 2018-12-18
Attending: ORTHOPAEDIC SURGERY
Payer: COMMERCIAL

## 2018-12-18 VITALS
HEART RATE: 78 BPM | HEIGHT: 64 IN | OXYGEN SATURATION: 97 % | DIASTOLIC BLOOD PRESSURE: 74 MMHG | SYSTOLIC BLOOD PRESSURE: 126 MMHG | BODY MASS INDEX: 21 KG/M2 | WEIGHT: 123 LBS | RESPIRATION RATE: 13 BRPM

## 2018-12-18 DIAGNOSIS — S62.357D CLOSED NONDISPLACED FRACTURE OF SHAFT OF FIFTH METACARPAL BONE OF LEFT HAND WITH ROUTINE HEALING, SUBSEQUENT ENCOUNTER: Primary | ICD-10-CM

## 2018-12-18 DIAGNOSIS — S62.357D CLOSED NONDISPLACED FRACTURE OF SHAFT OF FIFTH METACARPAL BONE OF LEFT HAND WITH ROUTINE HEALING, SUBSEQUENT ENCOUNTER: ICD-10-CM

## 2018-12-18 PROCEDURE — 73130 X-RAY EXAM OF HAND: CPT | Mod: LT

## 2018-12-18 PROCEDURE — 99207 ZZC FRACTURE CARE IN GLOBAL PERIOD: CPT | Performed by: ORTHOPAEDIC SURGERY

## 2018-12-18 ASSESSMENT — MIFFLIN-ST. JEOR: SCORE: 1099.98

## 2018-12-18 NOTE — LETTER
12/18/2018         RE: Shruti Gifford  [address] [facility] [address]        Dear Colleague,    Thank you for referring your patient, Shruti Gifford, to the [facility]. Please see a copy of my visit note below.    Follow-up left fifth metacarpal fracture sustained 9/29/18.  She has been out of a cast for a month.  Still complains of pain with use.  There is mild  tenderness to direct palpation of the fracture. Slight  pain with longitudinal compression. There is some pain with bending force applied.  She has full range of motion of the fingers.    X-ray shows very sparse callus formation at the fifth metacarpal.    Assessment:  Left 5th metacarpal fracture with early slow healing.  Plan:  The fracture is not ready for impact.  Gentle range of motion allowed. No aggressive use.  Return to clinic 3 months with repeat x-ray left hand before starting gardening..    Again, thank you for allowing me to participate in the care of your patient.        Sincerely,        Maximilian Weeks MD

## 2018-12-19 NOTE — PROGRESS NOTES
Follow-up left fifth metacarpal fracture sustained 9/29/18.  She has been out of a cast for a month.  Still complains of pain with use.  There is mild  tenderness to direct palpation of the fracture. Slight  pain with longitudinal compression. There is some pain with bending force applied.  She has full range of motion of the fingers.    X-ray shows very sparse callus formation at the fifth metacarpal.    Assessment:  Left 5th metacarpal fracture with early slow healing.  Plan:  The fracture is not ready for impact.  Gentle range of motion allowed. No aggressive use.  Return to clinic 3 months with repeat x-ray left hand before starting gardening..

## 2019-03-19 ENCOUNTER — OFFICE VISIT (OUTPATIENT)
Dept: ORTHOPEDICS | Facility: CLINIC | Age: 64
End: 2019-03-19
Payer: COMMERCIAL

## 2019-03-19 ENCOUNTER — ANCILLARY PROCEDURE (OUTPATIENT)
Dept: GENERAL RADIOLOGY | Facility: CLINIC | Age: 64
End: 2019-03-19
Attending: ORTHOPAEDIC SURGERY
Payer: COMMERCIAL

## 2019-03-19 VITALS — RESPIRATION RATE: 13 BRPM | BODY MASS INDEX: 21 KG/M2 | WEIGHT: 123 LBS | HEIGHT: 64 IN

## 2019-03-19 DIAGNOSIS — S62.357D CLOSED NONDISPLACED FRACTURE OF SHAFT OF FIFTH METACARPAL BONE OF LEFT HAND WITH ROUTINE HEALING, SUBSEQUENT ENCOUNTER: Primary | ICD-10-CM

## 2019-03-19 DIAGNOSIS — S62.357D CLOSED NONDISPLACED FRACTURE OF SHAFT OF FIFTH METACARPAL BONE OF LEFT HAND WITH ROUTINE HEALING, SUBSEQUENT ENCOUNTER: ICD-10-CM

## 2019-03-19 PROCEDURE — 73130 X-RAY EXAM OF HAND: CPT | Mod: LT

## 2019-03-19 PROCEDURE — 99212 OFFICE O/P EST SF 10 MIN: CPT | Performed by: ORTHOPAEDIC SURGERY

## 2019-03-19 ASSESSMENT — MIFFLIN-ST. JEOR: SCORE: 1094.98

## 2019-03-19 NOTE — LETTER
3/19/2019         RE: Shruti Gifford  [address] [facility] [address]        Dear Colleague,    Thank you for referring your patient, Shruti Gifford, to the [facility]. Please see a copy of my visit note below.    Follow-up left fifth metacarpal fracture sustained 9/29/18.  She has been working on range of motion.  There is no tenderness to direct palpation of the fracture. No pain with longitudinal compression. There is no pain with bending force applied.  She has full range of motion of the fingers.    X-ray shows good healing and remodeling at the fifth metacarpal.    Assessment:  Left 5th metacarpal fracture well healed.  Plan:  Resume activity as tolerated.  Return to clinic as needed.    Again, thank you for allowing me to participate in the care of your patient.        Sincerely,        Maximilian Weeks MD

## 2019-03-19 NOTE — PROGRESS NOTES
Follow-up left fifth metacarpal fracture sustained 9/29/18.  She has been working on range of motion.  There is no tenderness to direct palpation of the fracture. No pain with longitudinal compression. There is no pain with bending force applied.  She has full range of motion of the fingers.    X-ray shows good healing and remodeling at the fifth metacarpal.    Assessment:  Left 5th metacarpal fracture well healed.  Plan:  Resume activity as tolerated.  Return to clinic as needed.

## 2019-04-27 DIAGNOSIS — F33.1 MAJOR DEPRESSIVE DISORDER, RECURRENT EPISODE, MODERATE (H): ICD-10-CM

## 2019-04-29 ENCOUNTER — OFFICE VISIT (OUTPATIENT)
Dept: FAMILY MEDICINE | Facility: CLINIC | Age: 64
End: 2019-04-29
Payer: COMMERCIAL

## 2019-04-29 VITALS
TEMPERATURE: 96.4 F | BODY MASS INDEX: 21.97 KG/M2 | SYSTOLIC BLOOD PRESSURE: 95 MMHG | WEIGHT: 126 LBS | OXYGEN SATURATION: 97 % | DIASTOLIC BLOOD PRESSURE: 63 MMHG | HEART RATE: 67 BPM

## 2019-04-29 DIAGNOSIS — K13.79 ORAL MASS: Primary | ICD-10-CM

## 2019-04-29 DIAGNOSIS — E03.9 HYPOTHYROIDISM, UNSPECIFIED TYPE: ICD-10-CM

## 2019-04-29 LAB — TSH SERPL DL<=0.005 MIU/L-ACNC: 0.55 MU/L (ref 0.4–4)

## 2019-04-29 PROCEDURE — 84443 ASSAY THYROID STIM HORMONE: CPT | Performed by: FAMILY MEDICINE

## 2019-04-29 PROCEDURE — 36415 COLL VENOUS BLD VENIPUNCTURE: CPT | Performed by: FAMILY MEDICINE

## 2019-04-29 PROCEDURE — 99213 OFFICE O/P EST LOW 20 MIN: CPT | Performed by: FAMILY MEDICINE

## 2019-04-29 RX ORDER — TRAZODONE HYDROCHLORIDE 50 MG/1
100 TABLET, FILM COATED ORAL AT BEDTIME
Qty: 60 TABLET | Refills: 5 | Status: SHIPPED | OUTPATIENT
Start: 2019-04-29 | End: 2019-10-24

## 2019-04-29 NOTE — TELEPHONE ENCOUNTER
"Requested Prescriptions   Pending Prescriptions Disp Refills     traZODone (DESYREL) 50 MG tablet [Pharmacy Med Name: TRAZODONE 50 MG TABLET] 60 tablet 5     Sig: TAKE 2 TABLETS (100 MG) BY MOUTH AT BEDTIME   Last Written Prescription Date:  10/23/18  Last Fill Quantity: 60,  # refills: 5   Last office visit: 11/30/2018 with prescribing provider:     Future Office Visit:   Next 5 appointments (look out 90 days)    Apr 29, 2019 10:20 AM CDT  MyChart Long with Dick Diaz MD  [facility] [address] [place] [id number]  [phone]             Serotonin Modulators Passed - 4/27/2019  8:34 AM        Passed - Recent (12 mo) or future (30 days) visit within the authorizing provider's specialty     Patient had office visit in the last 12 months or has a visit in the next 30 days with authorizing provider or within the authorizing provider's specialty.  See \"Patient Info\" tab in inbasket, or \"Choose Columns\" in Meds & Orders section of the refill encounter.              Passed - Medication is active on med list        Passed - Patient is age 18 or older        Passed - No active pregnancy on record        Passed - No positive pregnancy test in past 12 months          "

## 2019-04-29 NOTE — TELEPHONE ENCOUNTER
Routing refill request to provider for review/approval because:  Drug interaction warning  Drug-Drug: citalopram and traZODone   Additive QT interval prolongation may occur during coadministration of Citalopram and QT Prolonging Agents 2. Coadministration of Citalopram and QT Prolonging Agents 2 is not recommended    Peggy Tipton RN

## 2019-04-29 NOTE — PROGRESS NOTES
SUBJECTIVE:   Shruti Gifford is a 64 year old female who presents to clinic today for the following   health issues:      Medication Followup of all meds    Taking Medication as prescribed: yes    Side Effects:  None    Medication Helping Symptoms:  yes     Had a lump in her mouth and now has jaw pain.    Saw dentist and then went to an oral surgeon   She was told she needed a biopsy   This has not been done yet   Patient feels like she is shaking more.          O: BP 95/63 (BP Location: Right arm, Patient Position: Chair, Cuff Size: Adult Regular)   Pulse 67   Temp 96.4  F (35.8  C) (Oral)   Wt 57.2 kg (126 lb)   SpO2 97%   BMI 21.97 kg/m      Apparent torus along the gum line in the front of the teeth.  Hard mass  medial to the upper molars.  This is firm nontender.      ICD-10-CM    1. Oral mass K13.70    2. Hypothyroidism, unspecified type E03.9 **TSH with free T4 reflex FUTURE 2mo     Hard mass being evaluated   Last TSH was low.  Her thyroid medication was adjusted and she is here for a follow-up tsh

## 2019-04-30 DIAGNOSIS — E03.9 HYPOTHYROIDISM, UNSPECIFIED TYPE: ICD-10-CM

## 2019-04-30 RX ORDER — LEVOTHYROXINE SODIUM 150 UG/1
150 TABLET ORAL DAILY
Qty: 90 TABLET | Refills: 3 | Status: SHIPPED | OUTPATIENT
Start: 2019-04-30 | End: 2020-03-24

## 2019-05-20 DIAGNOSIS — F33.1 MAJOR DEPRESSIVE DISORDER, RECURRENT EPISODE, MODERATE (H): ICD-10-CM

## 2019-05-20 NOTE — TELEPHONE ENCOUNTER
After Visit Summary   10/2/2018    William Lechuga    MRN: [id number]           Patient Information     Date Of Birth          1939        Visit Information        Provider Department      10/2/2018 2:30 PM Prakash Gonzalez MD [facility]        Today's Diagnoses     Benign essential hypertension    -  1    Malignant neoplasm of thyroid gland (H)        Cervicalgia        Chronic atrial fibrillation (H)           Follow-ups after your visit        Additional Services     PHYSICAL THERAPY REFERRAL       If you have not heard from the scheduling office within 2 business days, please call [phone] for all locations, with the exception of [place], please call [phone] and [place], please call [phone].    Please be aware that coverage of these services is subject to the terms and limitations of your health insurance plan.  Call member services at your health plan with any benefit or coverage questions.                  Follow-up notes from your care team     Return in about 2 weeks (around 10/16/2018) for BP Recheck.      Your next 10 appointments already scheduled     Nov 15, 2018  8:30 AM CST   Anticoagulation Visit with BE ANTI COAG   [facility] ([facility])    [address] [facility] [address]   [phone]              Future tests that were ordered for you today     Open Future Orders        Priority Expected Expires Ordered    PHYSICAL THERAPY REFERRAL Routine  10/2/2019 10/2/2018    T4 free Routine  10/2/2019 10/2/2018    TSH Routine  10/2/2019 10/2/2018    **Basic metabolic panel FUTURE 14d Routine 10/9/2018 10/16/2018 10/2/2018            Who to contact     Normal or non-critical lab and imaging results will be communicated to you by MyChart, letter or phone within 4 business days after the clinic has received the results. If you do not hear from us within 7 days, please contact the clinic through MyChart or phone. If you have a  "Requested Prescriptions   Pending Prescriptions Disp Refills     buPROPion (WELLBUTRIN SR) 150 MG 12 hr tablet [Pharmacy Med Name: BUPROPION HCL  MG TABLET] 90 tablet 3     Sig: TAKE 1 TABLET (150 MG) BY MOUTH DAILY AS NEEDED   Last Written Prescription Date:  5/21/18  Last Fill Quantity: 90,  # refills: 3   Last office visit: 4/29/2019 with prescribing provider:     Future Office Visit:        SSRIs Protocol Failed - 5/20/2019  1:15 AM        Failed - Medication is active on med list        Passed - PHQ-9 score less than 5 in past 6 months     Please review last PHQ-9 score.           Passed - Medication is Bupropion     If the medication is Bupropion (Wellbutrin), and the patient is taking for smoking cessation; OK to refill.          Passed - Patient is age 18 or older        Passed - No active pregnancy on record        Passed - No positive pregnancy test in last 12 months        Passed - Recent (6 mo) or future (30 days) visit within the authorizing provider's specialty     Patient had office visit in the last 6 months or has a visit in the next 30 days with authorizing provider or within the authorizing provider's specialty.  See \"Patient Info\" tab in inbasket, or \"Choose Columns\" in Meds & Orders section of the refill encounter.              " critical or abnormal lab result, we will notify you by phone as soon as possible.  Submit refill requests through [facility] or call your pharmacy and they will forward the refill request to us. Please allow 3 business days for your refill to be completed.          If you need to speak with a  for additional information , please call: [phone]             Additional Information About Your Visit        Care EveryWhere ID     This is your Care EveryWhere ID. This could be used by other organizations to access your [place] medical records  [id number]        Your Vitals Were     Pulse Temperature Respirations Pulse Oximetry BMI (Body Mass Index)       67 96.8  F (36  C) (Tympanic) 16 98% 31.06 kg/m2        Blood Pressure from Last 3 Encounters:   10/02/18 (!) 151/92   01/08/18 150/76   01/06/17 133/82    Weight from Last 3 Encounters:   10/02/18 229 lb (103.9 kg)   01/08/18 238 lb (108 kg)   01/06/17 228 lb (103.4 kg)                 Today's Medication Changes          These changes are accurate as of 10/2/18  3:27 PM.  If you have any questions, ask your nurse or doctor.               These medicines have changed or have updated prescriptions.        Dose/Directions    lisinopril 40 MG tablet   Commonly known as:  PRINIVIL/ZESTRIL   This may have changed:    - medication strength  - how much to take  - how to take this  - when to take this   Used for:  Benign essential hypertension   Changed by:  Prakash Gonzalez MD        Dose:  40 mg   Take 1 tablet (40 mg) by mouth daily   Quantity:  90 tablet   Refills:  3            Where to get your medicines      These medications were sent to [facility] [address] [place], [place] [address] [facility]  [address] [facility] [address]     Phone:  [phone]     lisinopril 40 MG tablet                Primary Care Provider Office Phone # Fax #    Narcisa Mcmahon [phone] [address] [facility] [address]        Equal  Access to Services     [facility]: Hadii jaylin mora ana Warren, wamaria eda luqiban, qamushtaqta kadarío ninoskadeepalicandy reddymerlindulce coates. So [facility] [phone].    ATENCIÓN: Si habla neyda, tiene a jaramillo disposición servicios gratuitos de asistencia lingüística. Llame al [phone].    We comply with applicable federal civil rights laws and [place] laws. We do not discriminate on the basis of race, color, national origin, age, disability, sex, sexual orientation, or gender identity.            Thank you!     Thank you for choosing [facility]  for your care. Our goal is always to provide you with excellent care. Hearing back from our patients is one way we can continue to improve our services. Please take a few minutes to complete the written survey that you may receive in the mail after your visit with us. Thank you!             Your Updated Medication List - Protect others around you: Learn how to safely use, store and throw away your medicines at www.disposemymeds.org.          This list is accurate as of 10/2/18  3:27 PM.  Always use your most recent med list.                   Brand Name Dispense Instructions for use Diagnosis    hydrochlorothiazide 25 MG tablet    HYDRODIURIL     1 TABLET EVERY MORNING        ipratropium 0.06 % spray    ATROVENT          levothyroxine 137 MCG tablet    SYNTHROID/LEVOTHROID     137 mcg daily        lisinopril 40 MG tablet    PRINIVIL/ZESTRIL    90 tablet    Take 1 tablet (40 mg) by mouth daily    Benign essential hypertension       metoprolol succinate 100 MG 24 hr tablet    TOPROL-XL     100 mg daily        order for DME     1 each    please draw INR the week of February 19-23. Please fax results to [phone]. [facility] INR clinic.  phone number [phone]    Long-term (current) use of anticoagulants, Chronic atrial fibrillation (H)       simvastatin 40 MG tablet    ZOCOR     40 mg daily        VITAMIN D3 PO      Take 500 Units by mouth  daily        warfarin 5 MG tablet    COUMADIN     Take 1.5 tablets Mondays and Thursdays and 1 tablet all other days of the week or as directed    Chronic atrial fibrillation (H)

## 2019-05-21 RX ORDER — BUPROPION HYDROCHLORIDE 150 MG/1
150 TABLET, EXTENDED RELEASE ORAL DAILY
Qty: 30 TABLET | Refills: 5 | Status: SHIPPED | OUTPATIENT
Start: 2019-05-21 | End: 2019-11-13

## 2019-06-01 DIAGNOSIS — F33.1 MAJOR DEPRESSIVE DISORDER, RECURRENT EPISODE, MODERATE (H): ICD-10-CM

## 2019-06-03 NOTE — TELEPHONE ENCOUNTER
"Requested Prescriptions   Pending Prescriptions Disp Refills     citalopram (CELEXA) 20 MG tablet [Pharmacy Med Name: CITALOPRAM HBR 20 MG TABLET] 90 tablet 3     Sig: TAKE 1 TABLET BY MOUTH EVERY DAY   Last Written Prescription Date:  5/21/18  Last Fill Quantity: 90,  # refills: 3   Last office visit: 4/29/2019 with prescribing provider:     Future Office Visit:        SSRIs Protocol Failed - 6/1/2019  9:05 AM        Failed - PHQ-9 score less than 5 in past 6 months     Please review last PHQ-9 score.           Passed - Medication is active on med list        Passed - Patient is age 18 or older        Passed - No active pregnancy on record        Passed - No positive pregnancy test in last 12 months        Passed - Recent (6 mo) or future (30 days) visit within the authorizing provider's specialty     Patient had office visit in the last 6 months or has a visit in the next 30 days with authorizing provider or within the authorizing provider's specialty.  See \"Patient Info\" tab in inbasket, or \"Choose Columns\" in Meds & Orders section of the refill encounter.              "

## 2019-06-04 RX ORDER — CITALOPRAM HYDROBROMIDE 20 MG/1
TABLET ORAL
Qty: 90 TABLET | Refills: 0 | Status: SHIPPED | OUTPATIENT
Start: 2019-06-04 | End: 2019-06-21

## 2019-06-04 NOTE — TELEPHONE ENCOUNTER
PHQ-9 score:    PHQ-9 SCORE 11/30/2018   PHQ-9 Total Score -   PHQ-9 Total Score MyChart -   PHQ-9 Total Score 4       Routing refill request to provider for review/approval because:  PHQ 9 overdue.  Juliette Duran RN CPC Triage.

## 2019-06-13 ENCOUNTER — MYC REFILL (OUTPATIENT)
Dept: FAMILY MEDICINE | Facility: CLINIC | Age: 64
End: 2019-06-13

## 2019-06-13 DIAGNOSIS — F33.1 MAJOR DEPRESSIVE DISORDER, RECURRENT EPISODE, MODERATE (H): ICD-10-CM

## 2019-06-13 RX ORDER — CITALOPRAM HYDROBROMIDE 20 MG/1
20 TABLET ORAL DAILY
Qty: 90 TABLET | Refills: 0 | Status: CANCELLED | OUTPATIENT
Start: 2019-06-13

## 2019-06-14 NOTE — TELEPHONE ENCOUNTER
"Requested Prescriptions   Pending Prescriptions Disp Refills     citalopram (CELEXA) 20 MG tablet 90 tablet 0     Sig: Take 1 tablet (20 mg) by mouth daily       SSRIs Protocol Failed - 6/13/2019  9:30 AM        Failed - PHQ-9 score less than 5 in past 6 months     Please review last PHQ-9 score.           Passed - Medication is active on med list        Passed - Patient is age 18 or older        Passed - No active pregnancy on record        Passed - No positive pregnancy test in last 12 months        Passed - Recent (6 mo) or future (30 days) visit within the authorizing provider's specialty     Patient had office visit in the last 6 months or has a visit in the next 30 days with authorizing provider or within the authorizing provider's specialty.  See \"Patient Info\" tab in inbasket, or \"Choose Columns\" in Meds & Orders section of the refill encounter.              "

## 2019-06-17 NOTE — TELEPHONE ENCOUNTER
Called [facility] spoke to Leena pharmacist - gave verbal order for Celexa - pharmacy will contact patient.    Radha Hernandez RN  [facility]

## 2019-06-17 NOTE — TELEPHONE ENCOUNTER
Reason for Call:  Other     Detailed comments:  Patient is calling because pharmacy(where we sent it to) called patient and let her know that they never received the script that we sent to them on 6/4/19.  Please resend to pharmacy.    Phone Number Patient can be reached at: Home number on file [phone] (home)    Best Time: any    Can we leave a detailed message on this number? YES    Call taken on 6/17/2019 at 12:22 PM by Clarissa Wade

## 2019-06-20 ENCOUNTER — MYC REFILL (OUTPATIENT)
Dept: FAMILY MEDICINE | Facility: CLINIC | Age: 64
End: 2019-06-20

## 2019-06-20 DIAGNOSIS — F33.1 MAJOR DEPRESSIVE DISORDER, RECURRENT EPISODE, MODERATE (H): ICD-10-CM

## 2019-06-20 RX ORDER — CITALOPRAM HYDROBROMIDE 20 MG/1
20 TABLET ORAL DAILY
Qty: 90 TABLET | Refills: 0 | Status: CANCELLED | OUTPATIENT
Start: 2019-06-20

## 2019-06-21 DIAGNOSIS — F33.1 MAJOR DEPRESSIVE DISORDER, RECURRENT EPISODE, MODERATE (H): ICD-10-CM

## 2019-06-21 NOTE — TELEPHONE ENCOUNTER
"Requested Prescriptions   Pending Prescriptions Disp Refills     citalopram (CELEXA) 20 MG tablet [Pharmacy Med Name: CITALOPRAM HBR 20 MG TABLET]  Last Written Prescription Date:  6/4/19  Last Fill Quantity: 90,  # refills: 0   Last office visit: 4/29/2019 with prescribing provider:  Emily   Future Office Visit:     90 tablet 3     Sig: TAKE 1 TABLET BY MOUTH EVERY DAY       SSRIs Protocol Failed - 6/21/2019  9:23 AM        Failed - PHQ-9 score less than 5 in past 6 months     Please review last PHQ-9 score.           Passed - Medication is active on med list        Passed - Patient is age 18 or older        Passed - No active pregnancy on record        Passed - No positive pregnancy test in last 12 months        Passed - Recent (6 mo) or future (30 days) visit within the authorizing provider's specialty     Patient had office visit in the last 6 months or has a visit in the next 30 days with authorizing provider or within the authorizing provider's specialty.  See \"Patient Info\" tab in inbasket, or \"Choose Columns\" in Meds & Orders section of the refill encounter.              "

## 2019-06-25 RX ORDER — CITALOPRAM HYDROBROMIDE 20 MG/1
TABLET ORAL
Qty: 30 TABLET | Refills: 0 | Status: SHIPPED | OUTPATIENT
Start: 2019-06-25 | End: 2019-07-15

## 2019-06-25 NOTE — TELEPHONE ENCOUNTER
DX Code Needed  WE NEED A NEW SCRIPT. WE DID NOT RECEIVE A SCRIPT IN JUNE AND PATIENT SAID SHE NEEDS TO START ON TUESDAY..      PHQ-9 score:    PHQ-9 SCORE 11/30/2018   PHQ-9 Total Score -   PHQ-9 Total Score MyChart -   PHQ-9 Total Score 4     Please see above message.  Also patient is overdue for PHQ 9.  Also last OV for depression was 11/30/18.    Juliette Duran RN CPC Triage.

## 2019-07-15 ENCOUNTER — OFFICE VISIT (OUTPATIENT)
Dept: FAMILY MEDICINE | Facility: CLINIC | Age: 64
End: 2019-07-15
Payer: COMMERCIAL

## 2019-07-15 ENCOUNTER — TELEPHONE (OUTPATIENT)
Dept: FAMILY MEDICINE | Facility: CLINIC | Age: 64
End: 2019-07-15

## 2019-07-15 VITALS
WEIGHT: 130 LBS | DIASTOLIC BLOOD PRESSURE: 71 MMHG | TEMPERATURE: 97.4 F | SYSTOLIC BLOOD PRESSURE: 120 MMHG | OXYGEN SATURATION: 97 % | BODY MASS INDEX: 22.67 KG/M2 | HEART RATE: 59 BPM

## 2019-07-15 DIAGNOSIS — F33.1 MAJOR DEPRESSIVE DISORDER, RECURRENT EPISODE, MODERATE (H): ICD-10-CM

## 2019-07-15 PROCEDURE — 99213 OFFICE O/P EST LOW 20 MIN: CPT | Performed by: FAMILY MEDICINE

## 2019-07-15 RX ORDER — CITALOPRAM HYDROBROMIDE 20 MG/1
20 TABLET ORAL DAILY
Qty: 90 TABLET | Refills: 3 | Status: SHIPPED | OUTPATIENT
Start: 2019-07-15 | End: 2020-05-12

## 2019-07-15 ASSESSMENT — PATIENT HEALTH QUESTIONNAIRE - PHQ9: SUM OF ALL RESPONSES TO PHQ QUESTIONS 1-9: 15
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
## 2019-08-15 NOTE — TELEPHONE ENCOUNTER
PHQ 9 completed and was 9 today.  Please see screening in epic.    Patient stated that she is feeling better, and does not have any concerns or questions at this time.    Routed to PCP.  Juliette Duran RN CPC Triage.

## 2019-10-24 DIAGNOSIS — F33.1 MAJOR DEPRESSIVE DISORDER, RECURRENT EPISODE, MODERATE (H): ICD-10-CM

## 2019-10-24 RX ORDER — TRAZODONE HYDROCHLORIDE 50 MG/1
100 TABLET, FILM COATED ORAL AT BEDTIME
Qty: 60 TABLET | Refills: 3 | Status: SHIPPED | OUTPATIENT
Start: 2019-10-24 | End: 2020-02-12

## 2019-10-24 NOTE — TELEPHONE ENCOUNTER
Prescription approved per [facility] Refill Protocol.    Alejandra White, RN, BSN, PHN  [facility]: [place]

## 2019-10-24 NOTE — TELEPHONE ENCOUNTER
"Requested Prescriptions   Pending Prescriptions Disp Refills     traZODone (DESYREL) 50 MG tablet [Pharmacy Med Name: TRAZODONE 50 MG TABLET] 60 tablet 5     Sig: TAKE 2 TABLETS (100 MG) BY MOUTH AT BEDTIME   Last Written Prescription Date:  4-29-19  Last Fill Quantity: 60,  # refills: 5   Last office visit: 7/15/2019 with prescribing provider:  7-15-19   Future Office Visit:        Serotonin Modulators Passed - 10/24/2019  1:46 AM        Passed - Recent (12 mo) or future (30 days) visit within the authorizing provider's specialty     Patient has had an office visit with the authorizing provider or a provider within the authorizing providers department within the previous 12 mos or has a future within next 30 days. See \"Patient Info\" tab in inbasket, or \"Choose Columns\" in Meds & Orders section of the refill encounter.              Passed - Medication is active on med list        Passed - Patient is age 18 or older        Passed - No active pregnancy on record        Passed - No positive pregnancy test in past 12 months          "

## 2019-11-13 DIAGNOSIS — F33.1 MAJOR DEPRESSIVE DISORDER, RECURRENT EPISODE, MODERATE (H): ICD-10-CM

## 2019-11-13 NOTE — TELEPHONE ENCOUNTER
"Requested Prescriptions   Pending Prescriptions Disp Refills     buPROPion (WELLBUTRIN SR) 150 MG 12 hr tablet [Pharmacy Med Name: BUPROPION HCL  MG TABLET] 30 tablet 5     Sig: TAKE 1 TABLET BY MOUTH EVERY DAY   Last Written Prescription Date:  5-21-19  Last Fill Quantity: 30,  # refills: 5   Last office visit: 7/15/2019 with prescribing provider:  7-15-19   Future Office Visit:        SSRIs Protocol Failed - 11/13/2019  1:43 AM        Failed - PHQ-9 score less than 5 in past 6 months     Please review last PHQ-9 score.           Passed - Medication is Bupropion     If the medication is Bupropion (Wellbutrin), and the patient is taking for smoking cessation; OK to refill.          Passed - Medication is active on med list        Passed - Patient is age 18 or older        Passed - No active pregnancy on record        Passed - No positive pregnancy test in last 12 months        Passed - Recent (6 mo) or future (30 days) visit within the authorizing provider's specialty     Patient had office visit in the last 6 months or has a visit in the next 30 days with authorizing provider or within the authorizing provider's specialty.  See \"Patient Info\" tab in inbasket, or \"Choose Columns\" in Meds & Orders section of the refill encounter.              "

## 2019-11-14 RX ORDER — BUPROPION HYDROCHLORIDE 150 MG/1
TABLET, EXTENDED RELEASE ORAL
Qty: 30 TABLET | Refills: 5 | Status: SHIPPED | OUTPATIENT
Start: 2019-11-14 | End: 2020-05-11

## 2019-11-14 NOTE — TELEPHONE ENCOUNTER
Routing refill request to provider for review/approval because:  PHQ-9 score:    PHQ-9 SCORE 8/15/2019   PHQ-9 Total Score -   PHQ-9 Total Score MyChart -   PHQ-9 Total Score 9           Alejandra White RN, BSN, PHN  [facility]: [place]

## 2019-12-09 ENCOUNTER — HEALTH MAINTENANCE LETTER (OUTPATIENT)
Age: 64
End: 2019-12-09

## 2019-12-17 DIAGNOSIS — F33.1 MAJOR DEPRESSIVE DISORDER, RECURRENT EPISODE, MODERATE (H): ICD-10-CM

## 2019-12-17 RX ORDER — LAMOTRIGINE 100 MG/1
100 TABLET ORAL 2 TIMES DAILY
Qty: 60 TABLET | Refills: 3 | Status: SHIPPED | OUTPATIENT
Start: 2019-12-17 | End: 2020-04-13

## 2019-12-17 NOTE — TELEPHONE ENCOUNTER
Routing refill request to provider for review/approval because:  Failed protocol.  Juliette Duran RN,BSN  [facility]

## 2019-12-17 NOTE — TELEPHONE ENCOUNTER
"Requested Prescriptions   Pending Prescriptions Disp Refills     lamoTRIgine (LAMICTAL) 100 MG tablet [Pharmacy Med Name: LAMOTRIGINE 100 MG TABLET] 180 tablet 3     Sig: TAKE 1 TABLET (100 MG) BY MOUTH 2 TIMES DAILY   Last Written Prescription Date:  11/30/18  Last Fill Quantity: 180,  # refills: 3   Last office visit: 7/15/2019 with prescribing provider:     Future Office Visit:        Anti-Seizure Meds Protocol  Failed - 12/17/2019  2:06 AM        Failed - Review Authorizing provider's last note.      Refer to last progress notes: confirm request is for original authorizing provider (cannot be through other providers).          Failed - Normal CBC on file in past 26 months     Recent Labs   Lab Test 11/28/16  1521   WBC 5.1   RBC 4.72   HGB 14.8   HCT 43.9                    Failed - Normal platelet count on file in past 26 months     Recent Labs   Lab Test 11/28/16  1521                  Passed - Recent (12 mo) or future (30 days) visit within the authorizing provider's specialty     Patient has had an office visit with the authorizing provider or a provider within the authorizing providers department within the previous 12 mos or has a future within next 30 days. See \"Patient Info\" tab in inbasket, or \"Choose Columns\" in Meds & Orders section of the refill encounter.              Passed - Normal ALT or AST on file in past 26 months     Recent Labs   Lab Test 11/23/18  0845   ALT 21     Recent Labs   Lab Test 11/23/18  0845   AST 17             Passed - Medication is active on med list        Passed - No active pregnancy on record        Passed - No positive pregnancy test in last 12 months          "

## 2020-01-06 ASSESSMENT — PATIENT HEALTH QUESTIONNAIRE - PHQ9: SUM OF ALL RESPONSES TO PHQ QUESTIONS 1-9: 9

## 2020-01-06 NOTE — TELEPHONE ENCOUNTER
The questions that were answered did not mean she has depression   The depression questions were normal   No change in meds

## 2020-02-03 ENCOUNTER — MYC MEDICAL ADVICE (OUTPATIENT)
Dept: FAMILY MEDICINE | Facility: CLINIC | Age: 65
End: 2020-02-03

## 2020-02-03 NOTE — TELEPHONE ENCOUNTER
See separate MyChart, regarding same concern.     Alejandra White, RN, BSN, PHN  [facility]: [place]

## 2020-02-04 ENCOUNTER — OFFICE VISIT (OUTPATIENT)
Dept: FAMILY MEDICINE | Facility: CLINIC | Age: 65
End: 2020-02-04
Payer: COMMERCIAL

## 2020-02-04 VITALS
TEMPERATURE: 97.5 F | OXYGEN SATURATION: 94 % | SYSTOLIC BLOOD PRESSURE: 118 MMHG | WEIGHT: 136.63 LBS | BODY MASS INDEX: 23.82 KG/M2 | DIASTOLIC BLOOD PRESSURE: 75 MMHG | HEART RATE: 73 BPM

## 2020-02-04 DIAGNOSIS — R05.9 COUGH: Primary | ICD-10-CM

## 2020-02-04 PROCEDURE — 99213 OFFICE O/P EST LOW 20 MIN: CPT | Performed by: FAMILY MEDICINE

## 2020-02-04 RX ORDER — DOXYCYCLINE HYCLATE 100 MG
100 TABLET ORAL 2 TIMES DAILY
Qty: 20 TABLET | Refills: 0 | Status: SHIPPED | OUTPATIENT
Start: 2020-02-04 | End: 2020-10-30

## 2020-02-04 RX ORDER — BENZONATATE 100 MG/1
100 CAPSULE ORAL 3 TIMES DAILY PRN
Qty: 21 CAPSULE | Refills: 0 | Status: SHIPPED | OUTPATIENT
Start: 2020-02-04 | End: 2020-10-30

## 2020-02-04 NOTE — PROGRESS NOTES
"Subjective     Shruti Gifford is a 65 year old female who presents to clinic today for the following health issues:    HPI     Acute Illness   Acute illness concerns: Cough  Onset: x 8 days    Fever: YES- on/off    Chills/Sweats: YES    Headache (location?): YES    Sinus Pressure:no    Conjunctivitis:  no    Ear Pain: YES: bilateral - On/off    Rhinorrhea: YES    Congestion: YES    Sore Throat: YES- Sometimes     Cough: YES-productive of green sputum    Wheeze: no     Decreased Appetite: no     Nausea: no    Vomiting: no    Diarrhea:  no    Dysuria/Freq.: no    Fatigue/Achiness: no    Sick/Strep Exposure: no      Therapies Tried and outcome: Day Quill, Cough drops does not help much    Temp has been around 100.5  Using otc treatment     Reviewed and updated as needed this visit by Provider          Allergies   Allergen Reactions     Codeine Sulfate      Hives     Flagyl [Metronidazole]      \"Head goes numb\"     Gabapentin Hives     Zyprexa Hives     Latex Rash     If wears latex gloves for an extended period of time develops a rash        Objective    /75 (BP Location: Right arm, Patient Position: Sitting, Cuff Size: Adult Regular)   Pulse 73   Temp 97.5  F (36.4  C) (Oral)   Wt 62 kg (136 lb 10.1 oz)   SpO2 94%   Breastfeeding No   BMI 23.82 kg/m    Body mass index is 23.82 kg/m .  Physical Exam     Head: Normocephalic, atraumatic.  Eyes: Conjunctiva clear, non icteric. PERRLA.  Ears: External ears and TMs normal BL.  Nose: Septum midline, nasal mucosa pink and moist. No discharge.  Mouth / Throat: Normal dentition.  No oral lesions. Pharynx non erythematous, tonsils without hypertrophy.  Neck: Supple, no enlarged LN, trachea midline.      Chest wall normal to inspection and palpation. Good excursion bilaterally. Lungs clear to auscultation. Good air movement bilaterally without rales, wheezes, or rhonchi.   Regular rate and  rhythm. S1 and S2 normal, no murmurs, clicks, gallops or rubs. No edema or " JVD.        ICD-10-CM    1. Cough R05 doxycycline hyclate (VIBRA-TABS) 100 MG tablet     benzonatate (TESSALON) 100 MG capsule

## 2020-02-04 NOTE — PATIENT INSTRUCTIONS

## 2020-02-11 DIAGNOSIS — F33.1 MAJOR DEPRESSIVE DISORDER, RECURRENT EPISODE, MODERATE (H): ICD-10-CM

## 2020-02-11 NOTE — TELEPHONE ENCOUNTER
"Requested Prescriptions   Pending Prescriptions Disp Refills     traZODone (DESYREL) 50 MG tablet [Pharmacy Med Name: TRAZODONE 50 MG TABLET] 60 tablet 3     Sig: TAKE 2 TABLETS (100 MG) BY MOUTH AT BEDTIME   Last Written Prescription Date:  1/24/19  Last Fill Quantity: 60,  # refills: 3   Last office visit: 2/4/2020 with prescribing provider:     Future Office Visit:        Serotonin Modulators Passed - 2/11/2020  1:24 AM        Passed - Recent (12 mo) or future (30 days) visit within the authorizing provider's specialty     Patient has had an office visit with the authorizing provider or a provider within the authorizing providers department within the previous 12 mos or has a future within next 30 days. See \"Patient Info\" tab in inbasket, or \"Choose Columns\" in Meds & Orders section of the refill encounter.              Passed - Medication is active on med list        Passed - Patient is age 18 or older        Passed - No active pregnancy on record        Passed - No positive pregnancy test in past 12 months          "

## 2020-02-12 NOTE — TELEPHONE ENCOUNTER
PHQ-9 score:    PHQ-9 SCORE 1/6/2020   PHQ-9 Total Score -   PHQ-9 Total Score MyChart -   PHQ-9 Total Score 9     Routing refill request to provider for review/approval because:  Drug not active on patient's medication list  Failed PHQ 9  Overdue for OV for depression.  Juliette Duran, RN,BSN  [facility]

## 2020-02-13 RX ORDER — TRAZODONE HYDROCHLORIDE 50 MG/1
100 TABLET, FILM COATED ORAL AT BEDTIME
Qty: 60 TABLET | Refills: 0 | Status: SHIPPED | OUTPATIENT
Start: 2020-02-13 | End: 2020-03-18

## 2020-03-15 ENCOUNTER — HEALTH MAINTENANCE LETTER (OUTPATIENT)
Age: 65
End: 2020-03-15

## 2020-03-18 DIAGNOSIS — F33.1 MAJOR DEPRESSIVE DISORDER, RECURRENT EPISODE, MODERATE (H): ICD-10-CM

## 2020-03-18 RX ORDER — TRAZODONE HYDROCHLORIDE 50 MG/1
100 TABLET, FILM COATED ORAL AT BEDTIME
Qty: 60 TABLET | Refills: 0 | Status: SHIPPED | OUTPATIENT
Start: 2020-03-18 | End: 2020-04-14

## 2020-03-23 DIAGNOSIS — E03.9 HYPOTHYROIDISM, UNSPECIFIED TYPE: Primary | ICD-10-CM

## 2020-03-23 DIAGNOSIS — E03.9 HYPOTHYROIDISM, UNSPECIFIED TYPE: ICD-10-CM

## 2020-03-23 PROCEDURE — 36415 COLL VENOUS BLD VENIPUNCTURE: CPT | Performed by: FAMILY MEDICINE

## 2020-03-23 PROCEDURE — 84443 ASSAY THYROID STIM HORMONE: CPT | Performed by: FAMILY MEDICINE

## 2020-03-24 DIAGNOSIS — E03.9 HYPOTHYROIDISM, UNSPECIFIED TYPE: ICD-10-CM

## 2020-03-24 LAB — TSH SERPL DL<=0.005 MIU/L-ACNC: 2.88 MU/L (ref 0.4–4)

## 2020-03-24 RX ORDER — LEVOTHYROXINE SODIUM 150 UG/1
150 TABLET ORAL DAILY
Qty: 90 TABLET | Refills: 3 | Status: SHIPPED | OUTPATIENT
Start: 2020-03-24 | End: 2020-10-30

## 2020-04-11 DIAGNOSIS — F33.1 MAJOR DEPRESSIVE DISORDER, RECURRENT EPISODE, MODERATE (H): ICD-10-CM

## 2020-04-13 RX ORDER — LAMOTRIGINE 100 MG/1
TABLET ORAL
Qty: 60 TABLET | Refills: 3 | Status: SHIPPED | OUTPATIENT
Start: 2020-04-13 | End: 2020-08-06

## 2020-04-14 DIAGNOSIS — F33.1 MAJOR DEPRESSIVE DISORDER, RECURRENT EPISODE, MODERATE (H): ICD-10-CM

## 2020-04-14 RX ORDER — TRAZODONE HYDROCHLORIDE 50 MG/1
100 TABLET, FILM COATED ORAL AT BEDTIME
Qty: 60 TABLET | Refills: 3 | Status: SHIPPED | OUTPATIENT
Start: 2020-04-14 | End: 2020-08-09

## 2020-04-14 NOTE — TELEPHONE ENCOUNTER
"Requested Prescriptions   Pending Prescriptions Disp Refills     traZODone (DESYREL) 50 MG tablet  Last Written Prescription Date:  3/18/2020  Last Fill Quantity: 60 tablet,  # refills: 0   Last office visit: 2/4/2020 with prescribing provider:  ASHWINI Diaz   Prescribing Provider's NPI: [id number] Dann Montero    Future Office Visit:   60 tablet 0     Sig: Take 2 tablets (100 mg) by mouth At Bedtime       Serotonin Modulators Passed - 4/14/2020 11:54 AM        Passed - Recent (12 mo) or future (30 days) visit within the authorizing provider's specialty     Patient has had an office visit with the authorizing provider or a provider within the authorizing providers department within the previous 12 mos or has a future within next 30 days. See \"Patient Info\" tab in inbasket, or \"Choose Columns\" in Meds & Orders section of the refill encounter.              Passed - Medication is active on med list        Passed - Patient is age 18 or older        Passed - No active pregnancy on record        Passed - No positive pregnancy test in past 12 months           "

## 2020-05-12 DIAGNOSIS — F33.1 MAJOR DEPRESSIVE DISORDER, RECURRENT EPISODE, MODERATE (H): ICD-10-CM

## 2020-05-12 RX ORDER — CITALOPRAM HYDROBROMIDE 20 MG/1
TABLET ORAL
Qty: 90 TABLET | Refills: 3 | Status: SHIPPED | OUTPATIENT
Start: 2020-05-12 | End: 2020-10-30

## 2020-06-11 ENCOUNTER — VIRTUAL VISIT (OUTPATIENT)
Dept: FAMILY MEDICINE | Facility: CLINIC | Age: 65
End: 2020-06-11
Payer: COMMERCIAL
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
## 2020-06-11 PROCEDURE — 99213 OFFICE O/P EST LOW 20 MIN: CPT | Mod: 95 | Performed by: FAMILY MEDICINE

## 2020-06-11 NOTE — PROGRESS NOTES
"Shruti Gifford is a 65 year old female who is being evaluated via a billable telephone visit.      The patient has been notified of following:     \"This telephone visit will be conducted via a call between you and your physician/provider. We have found that certain health care needs can be provided without the need for a physical exam.  This service lets us provide the care you need with a short phone conversation.  If a prescription is necessary we can send it directly to your pharmacy.  If lab work is needed we can place an order for that and you can then stop by our lab to have the test done at a later time.    Telephone visits are billed at different rates depending on your insurance coverage. During this emergency period, for some insurers they may be billed the same as an in-person visit.  Please reach out to your insurance provider with any questions.    If during the course of the call the physician/provider feels a telephone visit is not appropriate, you will not be charged for this service.\"    Patient has given verbal consent for Telephone visit?  Yes    What phone number would you like to be contacted at? [phone]    How would you like to obtain your AVS? Joe George     Shruti Gifford is a 65 year old female who presents via phone visit today for the following health issues:    HPI  Patient would like to discuss testing for Lyme disease.    Patient reports a week ago she had a tick attached to her head area she thinks it was attached for less than 24 hours.  For the first few days she has some numbness feeling to the site of attachment. However, she is not aware of any rashes, redness, abscess formation.  Currently, she has no other symptoms.  Denies previous history of Lyme disease, she has no fever, no headache, denies muscle or joint pain.  He has no nausea or vomiting.  She has no other symptoms.    Patient Active Problem List   Diagnosis     Fracture of nose, closed     Brow " ptosis     Distal radius fracture     Arm pain     Other postprocedural status(V45.89)     CARDIOVASCULAR SCREENING; LDL GOAL LESS THAN 160     Hypothyroidism due to acquired atrophy of thyroid     Major depressive disorder, recurrent episode, moderate (H)     TBI (traumatic brain injury) (H)     Closed displaced fracture of base of second metacarpal bone of right hand     Closed nondisplaced fracture of shaft of fifth metacarpal bone of left hand     Past Surgical History:   Procedure Laterality Date     ABDOMEN SURGERY      approximate year of part. hyster     CARPAL TUNNEL RELEASE RT/LT        SECTION       CLOSED REDUCTION, PERCUTANEOUS PINNING FINGER, COMBINED  2012    Procedure:COMBINED CLOSED REDUCTION, PERCUTANEOUS PINNING FINGER; Closed Reduction Internal Fixation and Percutaneous Pinning Right Small Finger.; Surgeon:SERGIO TOPETE; Location:[facility]     CRANIOTOMY  2012    Procedure:CRANIOTOMY; Elevation of compressed right temporal skull fracture; Surgeon:BOO ABDI; Location: OR     ENT SURGERY  2011    broken nose     GYN SURGERY       HEAD & NECK SURGERY  2012     HYSTERECTOMY       HYSTERECTOMY, PAP NO LONGER INDICATED       OPEN REDUCTION INTERNAL FIXATION WRIST  2013    Procedure: OPEN REDUCTION INTERNAL FIXATION WRIST;  Open Reduction Internal Fixation Right Distal Radius Fracture ;  Surgeon: Sergio Topete MD;  Location:  OR     ORTHOPEDIC SURGERY  2012     TONSILLECTOMY & ADENOIDECTOMY         Social History     Tobacco Use     Smoking status: Current Every Day Smoker     Packs/day: 0.50     Years: 34.00     Pack years: 17.00     Types: Cigarettes     Start date: 1980     Smokeless tobacco: Never Used   Substance Use Topics     Alcohol use: Yes     Comment: rare- 2 drinks/ month     Family History   Problem Relation Age of Onset     Other Cancer Mother      Coronary Artery Disease Father      Depression Brother      Depression Sister   "    Depression Sister      Anxiety Disorder Daughter      Coronary Artery Disease Sister      Obesity Sister      Anesthesia Reaction Daughter      Thyroid Disease Daughter          Current Outpatient Medications   Medication Sig Dispense Refill     aspirin 81 MG tablet Take 1 tablet by mouth daily.       benzonatate (TESSALON) 100 MG capsule Take 1 capsule (100 mg) by mouth 3 times daily as needed for cough 21 capsule 0     buPROPion (WELLBUTRIN SR) 150 MG 12 hr tablet TAKE 1 TABLET BY MOUTH EVERY DAY 30 tablet 6     citalopram (CELEXA) 20 MG tablet TAKE 1 TABLET BY MOUTH EVERY DAY 90 tablet 3     doxycycline hyclate (VIBRA-TABS) 100 MG tablet Take 1 tablet (100 mg) by mouth 2 times daily 20 tablet 0     lamoTRIgine (LAMICTAL) 100 MG tablet TAKE 1 TABLET BY MOUTH TWICE A DAY 60 tablet 3     levothyroxine (SYNTHROID/LEVOTHROID) 150 MCG tablet Take 1 tablet (150 mcg) by mouth daily 90 tablet 3     Naproxen Sodium (ALEVE PO) Take by mouth as needed for moderate pain       Psyllium (METAMUCIL PO)        Sennosides (EX-LAX PO) Take by mouth as needed       sodium chloride (OCEAN) 0.65 % nasal spray Spray 1 spray in nostril every hour as needed for congestion (dry nares ). 1 Bottle 0     traZODone (DESYREL) 50 MG tablet Take 2 tablets (100 mg) by mouth At Bedtime 60 tablet 3     UNABLE TO FIND MEDICATION NAME: generic allergy medication       Allergies   Allergen Reactions     Codeine Sulfate      Hives     Gabapentin Hives     Metronidazole      \"Head goes numb\"  Other reaction(s): Other, Other (see comments)  \"Makes my head go numb\"  Head numbness  Head goes numb       Zyprexa Hives     Latex Rash     If wears latex gloves for an extended period of time develops a rash       Reviewed and updated as needed this visit by Provider         Review of Systems   Constitutional, HEENT, cardiovascular, pulmonary, gi and gu systems are negative, except as otherwise noted.       Objective   Reported vitals:  There were no vitals " taken for this visit.   healthy, alert, active and no distress  PSYCH: Alert and oriented times 3; coherent speech, normal   rate and volume, able to articulate logical thoughts, able   to abstract reason, no tangential thoughts, no hallucinations   or delusions  Her affect is normal and pleasant  RESP: No cough, no audible wheezing, able to talk in full sentences  Remainder of exam unable to be completed due to telephone visits    Diagnostic Test Results:  Labs reviewed in Epic  Orders Placed This Encounter   Procedures     Lyme Disease Delia with reflex to WB Serum         Assessment/Plan:   Diagnosis Comments   1. Tick bite, initial encounter  Lyme Disease Delia with reflex to WB Serum      Discussed with patient since she does not have any symptoms, ticks was attached less than 24 hours.  Did not have any rash, or currently has no other symptoms.  The  likelihood for her to develop Lyme disease is very low.  In addition,  I advised her could be too early to have the test done since it is only been about a week ago.    I did put an order as a future order for the patient she was advised she could come in a few weeks time and have the lab test done.  No follow-ups on file.      Phone call duration:  7 minutes    Dann Montero MD

## 2020-08-07 DIAGNOSIS — F33.1 MAJOR DEPRESSIVE DISORDER, RECURRENT EPISODE, MODERATE (H): ICD-10-CM

## 2020-08-09 RX ORDER — TRAZODONE HYDROCHLORIDE 50 MG/1
100 TABLET, FILM COATED ORAL AT BEDTIME
Qty: 60 TABLET | Refills: 3 | Status: SHIPPED | OUTPATIENT
Start: 2020-08-09 | End: 2020-10-30

## 2020-08-13 DIAGNOSIS — F33.1 MAJOR DEPRESSIVE DISORDER, RECURRENT EPISODE, MODERATE (H): ICD-10-CM

## 2020-08-13 NOTE — TELEPHONE ENCOUNTER
Routing refill request to provider for review/approval because:  Labs not current:  Cbc, plt

## 2020-08-18 RX ORDER — LAMOTRIGINE 100 MG/1
TABLET ORAL
Qty: 180 TABLET | Refills: 3 | Status: SHIPPED | OUTPATIENT
Start: 2020-08-18 | End: 2020-10-30

## 2020-10-13 DIAGNOSIS — F33.1 MAJOR DEPRESSIVE DISORDER, RECURRENT EPISODE, MODERATE (H): ICD-10-CM

## 2020-10-13 RX ORDER — BUPROPION HYDROCHLORIDE 150 MG/1
TABLET, EXTENDED RELEASE ORAL
Qty: 30 TABLET | Refills: 5 | Status: CANCELLED | OUTPATIENT
Start: 2020-10-13

## 2020-10-16 ENCOUNTER — OFFICE VISIT (OUTPATIENT)
Dept: FAMILY MEDICINE | Facility: CLINIC | Age: 65
End: 2020-10-16
Payer: COMMERCIAL

## 2020-10-16 VITALS
WEIGHT: 133 LBS | DIASTOLIC BLOOD PRESSURE: 72 MMHG | TEMPERATURE: 98.3 F | BODY MASS INDEX: 23.19 KG/M2 | HEART RATE: 73 BPM | SYSTOLIC BLOOD PRESSURE: 109 MMHG | OXYGEN SATURATION: 95 %

## 2020-10-16 DIAGNOSIS — Z23 NEED FOR PROPHYLACTIC VACCINATION AND INOCULATION AGAINST INFLUENZA: ICD-10-CM

## 2020-10-16 DIAGNOSIS — B07.8 COMMON WART: Primary | ICD-10-CM

## 2020-10-16 DIAGNOSIS — Z23 NEED FOR SHINGLES VACCINE: ICD-10-CM

## 2020-10-16 PROCEDURE — 90750 HZV VACC RECOMBINANT IM: CPT | Performed by: PHYSICIAN ASSISTANT

## 2020-10-16 PROCEDURE — 90682 RIV4 VACC RECOMBINANT DNA IM: CPT | Performed by: PHYSICIAN ASSISTANT

## 2020-10-16 PROCEDURE — 90472 IMMUNIZATION ADMIN EACH ADD: CPT | Performed by: PHYSICIAN ASSISTANT

## 2020-10-16 PROCEDURE — 17110 DESTRUCTION B9 LES UP TO 14: CPT | Performed by: PHYSICIAN ASSISTANT

## 2020-10-16 PROCEDURE — G0008 ADMIN INFLUENZA VIRUS VAC: HCPCS | Performed by: PHYSICIAN ASSISTANT

## 2020-10-16 RX ORDER — BUPROPION HYDROCHLORIDE 150 MG/1
TABLET, EXTENDED RELEASE ORAL
Qty: 30 TABLET | Refills: 5 | Status: SHIPPED | OUTPATIENT
Start: 2020-10-16 | End: 2020-10-30

## 2020-10-16 NOTE — NURSING NOTE
Prior to immunization administration, verified patients identity using patient s name and date of birth. Please see Immunization Activity for additional information.     Screening Questionnaire for Adult Immunization    Are you sick today?   No   Do you have allergies to medications, food, a vaccine component or latex?   Yes   Have you ever had a serious reaction after receiving a vaccination?   No   Do you have a long-term health problem with heart, lung, kidney, or metabolic disease (e.g., diabetes), asthma, a blood disorder, no spleen, complement component deficiency, a cochlear implant, or a spinal fluid leak?  Are you on long-term aspirin therapy?   No   Do you have cancer, leukemia, HIV/AIDS, or any other immune system problem?   No   Do you have a parent, brother, or sister with an immune system problem?   No   In the past 3 months, have you taken medications that affect  your immune system, such as prednisone, other steroids, or anticancer drugs; drugs for the treatment of rheumatoid arthritis, Crohn s disease, or psoriasis; or have you had radiation treatments?   No   Have you had a seizure, or a brain or other nervous system problem?   No   During the past year, have you received a transfusion of blood or blood    products, or been given immune (gamma) globulin or antiviral drug?   No   For women: Are you pregnant or is there a chance you could become       pregnant during the next month?   No   Have you received any vaccinations in the past 4 weeks?   No     Immunization questionnaire was positive for at least one answer.  Notified HALIE Palma.        Per orders of Emigdio Abdullahi PA, injection of Flu and Shingrix vaccines  given by Sheryl Crook MA. Patient instructed to remain in clinic for 15 minutes afterwards, and to report any adverse reaction to me immediately.       Screening performed by Sheryl Crook MA on 10/16/2020 at 9:55 AM.

## 2020-10-16 NOTE — TELEPHONE ENCOUNTER
Routing refill request to provider for review/approval because:  Labs out of range:  PHQ  Labs not current:  PHQ  PHQ 7/15/2019 8/15/2019 1/6/2020   PHQ-9 Total Score 15 9 9   Q9: Thoughts of better off dead/self-harm past 2 weeks Several days Not at all Not at all   F/U: Thoughts of suicide or self-harm - No -   F/U: Safety concerns - No -   Debby Reis RN, BSN

## 2020-10-16 NOTE — PROGRESS NOTES
Subjective     Shruti Gifford is a 65 year old female who presents to clinic today for the following health issues:    HPI         Patient is here today to update vaccinations. She is interested in having her flu shot and shingles shot. She inquires about the pneumonia vaccine as well.     She has a wart on her right middle finger that she has been self treating for a number of years. She reports she cuts it off with a razor blade. She has never tried any other treatments.     She also inquires today about covid testing. Her  works at a [email or web address]. She does not have any symptoms. She has not had a fever. She has no cough. She has no known contacts to covid.     Patient is disabled and does not work currently.         Review of Systems   Constitutional, HEENT, cardiovascular, pulmonary, gi and gu systems are negative, except as otherwise noted.      Objective    /72 (BP Location: Left arm, Patient Position: Chair, Cuff Size: Adult Regular)   Pulse 73   Temp 98.3  F (36.8  C) (Oral)   Wt 60.3 kg (133 lb)   SpO2 95%   BMI 23.19 kg/m    Body mass index is 23.19 kg/m .  Physical Exam   GENERAL: healthy, alert and no distress  NECK: no adenopathy, no asymmetry, masses, or scars and thyroid normal to palpation  RESP: lungs clear to auscultation - no rales, rhonchi or wheezes  CV: regular rate and rhythm, normal S1 S2, no S3 or S4, no murmur, click or rub, no peripheral edema and peripheral pulses strong  ABDOMEN: soft, nontender, no hepatosplenomegaly, no masses and bowel sounds normal  MS: no gross musculoskeletal defects noted, no edema  SKIN: one 1 cm wart - right middle finger at MCP joint          Assessment & Plan     Common wart  Wart located on right middle finger was treated today. PARQ (Procedures, Alternatives, Risks, Questions) obtained. Shaving was performed using a #10 blade to excise overlying keratin. Liquid nitrogen cryotherapy was applied with freeze/thaw technique x3. Aftercare,  including blister formation, risks of bleeding, and risks of recurrence were discussed. All questions answered.  Return for retreatment in 2-3 weeks.   - DESTRUCT BENIGN LESION, UP TO 14    Need for prophylactic vaccination and inoculation against influenza  Vaccinated today.  - INFLUENZA QUAD, RECOMBINANT, P-FREE (RIV4) (FLUBLOCK) [00805]  - Vaccine Administration, Each Additional [18177]    Need for shingles vaccine  Vaccinated today.  - ZOSTER VACCINE RECOMBINANT ADJUVANTED IM NJX  - ADMIN 1st VACCINE     Tobacco Cessation:   reports that she has been smoking cigarettes. She started smoking about 40 years ago. She has a 17.00 pack-year smoking history. She has never used smokeless tobacco.  Tobacco Cessation Action Plan: Information offered: Patient not interested at this time         Return if symptoms worsen or fail to improve.    Kaylen Beal PA-C  [facility]

## 2020-10-30 ENCOUNTER — OFFICE VISIT (OUTPATIENT)
Dept: FAMILY MEDICINE | Facility: CLINIC | Age: 65
End: 2020-10-30
Payer: COMMERCIAL

## 2020-10-30 VITALS
SYSTOLIC BLOOD PRESSURE: 127 MMHG | HEIGHT: 63 IN | WEIGHT: 131 LBS | OXYGEN SATURATION: 98 % | DIASTOLIC BLOOD PRESSURE: 79 MMHG | TEMPERATURE: 98.1 F | BODY MASS INDEX: 23.21 KG/M2 | HEART RATE: 70 BPM

## 2020-10-30 DIAGNOSIS — E03.4 HYPOTHYROIDISM DUE TO ACQUIRED ATROPHY OF THYROID: ICD-10-CM

## 2020-10-30 DIAGNOSIS — Z12.31 ENCOUNTER FOR SCREENING MAMMOGRAM FOR MALIGNANT NEOPLASM OF BREAST: ICD-10-CM

## 2020-10-30 DIAGNOSIS — Z90.710 HISTORY OF HYSTERECTOMY: ICD-10-CM

## 2020-10-30 DIAGNOSIS — Z11.4 SCREENING FOR HIV (HUMAN IMMUNODEFICIENCY VIRUS): ICD-10-CM

## 2020-10-30 DIAGNOSIS — Z00.00 ENCOUNTER FOR MEDICARE ANNUAL WELLNESS EXAM: Primary | ICD-10-CM

## 2020-10-30 DIAGNOSIS — F33.1 MAJOR DEPRESSIVE DISORDER, RECURRENT EPISODE, MODERATE (H): ICD-10-CM

## 2020-10-30 DIAGNOSIS — S06.9X0S TRAUMATIC BRAIN INJURY, WITHOUT LOSS OF CONSCIOUSNESS, SEQUELA (H): ICD-10-CM

## 2020-10-30 DIAGNOSIS — G25.2 INTENTION TREMOR: ICD-10-CM

## 2020-10-30 DIAGNOSIS — B07.8 COMMON WART: ICD-10-CM

## 2020-10-30 LAB
ALBUMIN SERPL-MCNC: 3.9 G/DL (ref 3.4–5)
ALP SERPL-CCNC: 123 U/L (ref 40–150)
ALT SERPL W P-5'-P-CCNC: 27 U/L (ref 0–50)
ANION GAP SERPL CALCULATED.3IONS-SCNC: 7 MMOL/L (ref 3–14)
AST SERPL W P-5'-P-CCNC: 16 U/L (ref 0–45)
BILIRUB SERPL-MCNC: 0.6 MG/DL (ref 0.2–1.3)
BUN SERPL-MCNC: 14 MG/DL (ref 7–30)
CALCIUM SERPL-MCNC: 9.3 MG/DL (ref 8.5–10.1)
CHLORIDE SERPL-SCNC: 103 MMOL/L (ref 94–109)
CHOLEST SERPL-MCNC: 219 MG/DL
CO2 SERPL-SCNC: 27 MMOL/L (ref 20–32)
CREAT SERPL-MCNC: 0.93 MG/DL (ref 0.52–1.04)
GFR SERPL CREATININE-BSD FRML MDRD: 65 ML/MIN/{1.73_M2}
GLUCOSE SERPL-MCNC: 88 MG/DL (ref 70–99)
HBA1C MFR BLD: 5.2 % (ref 0–5.6)
HDLC SERPL-MCNC: 58 MG/DL
LDLC SERPL CALC-MCNC: 142 MG/DL
NONHDLC SERPL-MCNC: 161 MG/DL
POTASSIUM SERPL-SCNC: 4.2 MMOL/L (ref 3.4–5.3)
PROT SERPL-MCNC: 7.5 G/DL (ref 6.8–8.8)
SODIUM SERPL-SCNC: 137 MMOL/L (ref 133–144)
T3FREE SERPL-MCNC: 2.1 PG/ML (ref 2.3–4.2)
T4 FREE SERPL-MCNC: 1.28 NG/DL (ref 0.76–1.46)
TRIGL SERPL-MCNC: 93 MG/DL
TSH SERPL DL<=0.005 MIU/L-ACNC: 8.32 MU/L (ref 0.4–4)

## 2020-10-30 PROCEDURE — 80175 DRUG SCREEN QUAN LAMOTRIGINE: CPT | Mod: 90 | Performed by: PHYSICIAN ASSISTANT

## 2020-10-30 PROCEDURE — 87389 HIV-1 AG W/HIV-1&-2 AB AG IA: CPT | Performed by: PHYSICIAN ASSISTANT

## 2020-10-30 PROCEDURE — 80061 LIPID PANEL: CPT | Performed by: PHYSICIAN ASSISTANT

## 2020-10-30 PROCEDURE — 84481 FREE ASSAY (FT-3): CPT | Performed by: PHYSICIAN ASSISTANT

## 2020-10-30 PROCEDURE — 99213 OFFICE O/P EST LOW 20 MIN: CPT | Mod: 25 | Performed by: PHYSICIAN ASSISTANT

## 2020-10-30 PROCEDURE — 36415 COLL VENOUS BLD VENIPUNCTURE: CPT | Performed by: PHYSICIAN ASSISTANT

## 2020-10-30 PROCEDURE — 84439 ASSAY OF FREE THYROXINE: CPT | Performed by: PHYSICIAN ASSISTANT

## 2020-10-30 PROCEDURE — 84443 ASSAY THYROID STIM HORMONE: CPT | Performed by: PHYSICIAN ASSISTANT

## 2020-10-30 PROCEDURE — 99396 PREV VISIT EST AGE 40-64: CPT | Performed by: PHYSICIAN ASSISTANT

## 2020-10-30 PROCEDURE — 99000 SPECIMEN HANDLING OFFICE-LAB: CPT | Performed by: PHYSICIAN ASSISTANT

## 2020-10-30 PROCEDURE — 80053 COMPREHEN METABOLIC PANEL: CPT | Performed by: PHYSICIAN ASSISTANT

## 2020-10-30 PROCEDURE — 83036 HEMOGLOBIN GLYCOSYLATED A1C: CPT | Performed by: PHYSICIAN ASSISTANT

## 2020-10-30 RX ORDER — TRAZODONE HYDROCHLORIDE 50 MG/1
100 TABLET, FILM COATED ORAL AT BEDTIME
Qty: 60 TABLET | Refills: 3 | Status: SHIPPED | OUTPATIENT
Start: 2020-10-30 | End: 2021-04-20

## 2020-10-30 RX ORDER — BUPROPION HYDROCHLORIDE 150 MG/1
150 TABLET, EXTENDED RELEASE ORAL DAILY
Qty: 30 TABLET | Refills: 11 | Status: SHIPPED | OUTPATIENT
Start: 2020-10-30

## 2020-10-30 RX ORDER — LAMOTRIGINE 100 MG/1
100 TABLET ORAL 2 TIMES DAILY
Qty: 180 TABLET | Refills: 4 | Status: SHIPPED | OUTPATIENT
Start: 2020-10-30 | End: 2021-12-16

## 2020-10-30 RX ORDER — LEVOTHYROXINE SODIUM 150 UG/1
150 TABLET ORAL DAILY
Qty: 90 TABLET | Refills: 3 | Status: SHIPPED | OUTPATIENT
Start: 2020-10-30

## 2020-10-30 RX ORDER — CITALOPRAM HYDROBROMIDE 20 MG/1
20 TABLET ORAL DAILY
Qty: 90 TABLET | Refills: 3 | Status: SHIPPED | OUTPATIENT
Start: 2020-10-30 | End: 2021-11-03

## 2020-10-30 ASSESSMENT — ENCOUNTER SYMPTOMS
HEMATOCHEZIA: 0
ABDOMINAL PAIN: 0
HEMATURIA: 0
PALPITATIONS: 0
FREQUENCY: 0
HEARTBURN: 0
CHILLS: 1
PARESTHESIAS: 0
DIZZINESS: 1
SHORTNESS OF BREATH: 0
NAUSEA: 0
MYALGIAS: 0
CONSTIPATION: 0
WEAKNESS: 1
FEVER: 0
DIARRHEA: 1
DYSURIA: 0
ARTHRALGIAS: 0
COUGH: 0
BREAST MASS: 0
SORE THROAT: 0
JOINT SWELLING: 0
EYE PAIN: 1
HEADACHES: 1
NERVOUS/ANXIOUS: 1

## 2020-10-30 ASSESSMENT — MIFFLIN-ST. JEOR: SCORE: 1119.46

## 2020-10-30 ASSESSMENT — PATIENT HEALTH QUESTIONNAIRE - PHQ9: SUM OF ALL RESPONSES TO PHQ QUESTIONS 1-9: 13

## 2020-10-30 ASSESSMENT — ACTIVITIES OF DAILY LIVING (ADL): CURRENT_FUNCTION: NO ASSISTANCE NEEDED

## 2020-10-30 NOTE — PATIENT INSTRUCTIONS
Patient Education   Personalized Prevention Plan  You are due for the preventive services outlined below.  Your care team is available to assist you in scheduling these services.  If you have already completed any of these items, please share that information with your care team to update in your medical record.  Health Maintenance Due   Topic Date Due     Discuss Advance Care Planning  1955     HIV Screening  01/19/1972     Annual Wellness Visit  11/28/2017     Depression Assessment  01/15/2020     Mammogram  05/21/2020
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
## 2020-10-31 LAB — LAMOTRIGINE SERPL-MCNC: 6.5 UG/ML (ref 2.5–15)

## 2020-11-01 LAB — HIV 1+2 AB+HIV1 P24 AG SERPL QL IA: NONREACTIVE

## 2020-11-02 DIAGNOSIS — E03.4 HYPOTHYROIDISM DUE TO ACQUIRED ATROPHY OF THYROID: Primary | ICD-10-CM

## 2020-11-02 RX ORDER — LEVOTHYROXINE SODIUM 175 UG/1
175 TABLET ORAL DAILY
Qty: 90 TABLET | Refills: 0 | Status: SHIPPED | OUTPATIENT
Start: 2020-11-02 | End: 2021-03-17

## 2020-11-22 ENCOUNTER — VIRTUAL VISIT (OUTPATIENT)
Dept: FAMILY MEDICINE | Facility: OTHER | Age: 65
End: 2020-11-22

## 2020-11-22 NOTE — PROGRESS NOTES
"Date: 2020 08:58:56  Clinician: Kaylen Starr  Clinician NPI: [id number]  Patient: Shruti Gifford  Patient : 1955  Patient Address:  [place] [address]  Patient Phone: [phone]  Visit Protocol: URI  Patient Summary:  Shruti is a 65 year old ( : 1955 ) female who initiated a [facility] Visit for COVID-19 (Coronavirus) evaluation and screening. When asked the question \"Please sign me up to receive news, health information and promotions. \", Shruti responded \"No\".    When asked when her symptoms started, Shruti reported that she does not have any symptoms.   She denies taking antibiotic medication in the past month and having recent facial or sinus surgery in the past 60 days.    Pertinent COVID-19 (Coronavirus) information  Shruti does not work or volunteer as healthcare worker or a . In the past 14 days, Shruti has not worked or volunteered at a healthcare facility or group living setting.   In the past 14 days, she also has not lived in a congregate living setting.   Shruti has had a close contact with a laboratory-confirmed COVID-19 patient in the last 14 days. She was not exposed at her work. She does not know when she was exposed to the laboratory-confirmed COVID-19 patient.   Additional information about contact with COVID-19 (Coronavirus) patient as reported by the patient (free text): My  and I were at the [facility]. 2 of the teams found out some of their guys have it. They don't wear masks.   Shruti is not living in the same household with the COVID-19 positive patient. She was in an enclosed space for greater than 15 minutes with the COVID-19 patient.   During the encounter, only she was wearing a mask.   Since 2019, Shruti has not been tested for COVID-19 and has had upper respiratory infection (URI) or influenza-like illness.      Date(s) of previous URI or influenza-like illness (free-text): January or February     " Symptoms Shruti experienced during previous URI or influenza-like illness as reported by the patient (free-text): Bronchitis        Pertinent medical history  Shruti does not get yeast infections when she takes antibiotics.   Shruti does not need a return to work/school note.   Weight: 130 lbs   Shruti smokes or uses smokeless tobacco.   Weight: 130 lbs    MEDICATIONS: trazodone oral, citalopram oral, bupropion HCl oral, lamotrigine oral, levothyroxine oral, ALLERGIES: Tylenol-Codeine #3, Flagyl, gabapentin, Zyprexa  Clinician Response:  Dear Shruti,   Based on your exposure to COVID-19 (coronavirus), we would like to test you for this virus.  1. Please call [phone] to schedule your visit. Explain that you were referred by [facility] to have a COVID-19 test. Be ready to share your [facility] visit ID number.  * If you need to schedule in [facility] please call [phone] or for [employer] employees please call [phone].   * If you need to schedule in the [place] area please call [phone]. [employer] employees call [phone].   The following will serve as your written order for this COVID Test, ordered by me, for the indication of suspected COVID [Z20.828]: The test will be ordered in [employer], our electronic health record, after you are scheduled. It will show as ordered and authorized by Calderon Gaviria MD.  Order: COVID-19 (coronavirus) PCR for ASYMPTOMATIC EXPOSURE testing from [facility].   If you know you have had close contact with someone who tested positive, you should be quarantined for 14 days after this exposure. You should stay in quarantine for the14 days even if the covid test is negative, the optimal time to test after exposure is 5-7 days from the exposure  Quarantine means   What should I do?  For safety, it's very important to follow these rules. Do this for 14 days after the date you were last exposed to the virus..  Stay home and away from others. Don't go to school or anywhere else.  Generally quarantine means staying home from work but there are some exceptions to this. Please contact your workplace.   No hugging, kissing or shaking hands.  Don't let anyone visit.  Cover your mouth and nose with a mask, tissue or washcloth to avoid spreading germs.  Wash your hands and face often. Use soap and water.  What are the symptoms of COVID-19?  The most common symptoms are cough, fever and trouble breathing. Less common symptoms include headache, body aches, fatigue (feeling very tired), chills, sore throat, stuffy or runny nose, diarrhea (loose poop), loss of taste or smell, belly pain, and nausea or vomiting (feeling sick to your stomach or throwing up).  After 14 days, if you have still don't have symptoms, you likely don't have this virus.  If you develop symptoms, follow these guidelines.  If you're normally healthy: Please start another [facility] visit to report your symptoms. Go to [email or web address].  If you have a serious health problem (like cancer, heart failure, an organ transplant or kidney disease): Call your specialty clinic. Let them know that you might have COVID-19.  2. When it's time for your COVID test:  Stay at least 6 feet away from others. (If someone will drive you to your test, stay in the backseat, as far away from the  as you can.)  Cover your mouth and nose with a mask, tissue or washcloth.  Go straight to the testing site. Don't make any stops on the way there or back.  Please note  Caregivers in these groups are at risk for severe illness due to COVID-19:  o People 65 years and older  o People who live in a nursing home or long-term care facility  o People with chronic disease (lung, heart, cancer, diabetes, kidney, liver, immunologic)  o People who have a weakened immune system, including those who:  Are in cancer treatment  Take medicine that weakens the immune system, such as corticosteroids  Had a bone marrow or organ transplant  Have an immune deficiency  Have poorly  controlled HIV or AIDS  Are obese (body mass index of 40 or higher)  Smoke regularly  Where can I get more information?   [facility] -- About COVID-19: [email or web address]  CDC -- What to Do If You're Sick: www.cdc.gov/coronavirus/2019-ncov/about/steps-when-sick.html  [facility] -- Ending Home Isolation: www.cdc.gov/coronavirus/2019-ncov/hcp/disposition-in-home-patients.html  [facility] -- Caring for Someone: www.cdc.gov/coronavirus/2019-ncov/if-you-are-sick/care-for-someone.html  [facility] -- Interim Guidance for Hospital Discharge to Home: www.[facility].mn./diseases/coronavirus/hcp/hospdischarge.pdf  [facility] clinical trials (COVID-19 research studies): clinicalaffairs.[facility]-clinical-trials  Below are the COVID-19 hotlines at the [facility] ([facility]). Interpreters are available.  For health questions: Call 717-234-7608 or 1-515.582.7121 (7 a.m. to 7 p.m.)  For questions about schools and childcare: Call 466-658-7685 or 1-982.908.5386 (7 a.m. to 7 p.m.)    Diagnosis: Contact with and (suspected) exposure to other viral communicable diseases  Diagnosis ICD: Z20.828

## 2021-01-28 DIAGNOSIS — E03.4 HYPOTHYROIDISM DUE TO ACQUIRED ATROPHY OF THYROID: ICD-10-CM

## 2021-01-28 RX ORDER — LEVOTHYROXINE SODIUM 175 UG/1
175 TABLET ORAL DAILY
Qty: 90 TABLET | Refills: 0 | OUTPATIENT
Start: 2021-01-28

## 2021-02-08 ENCOUNTER — DOCUMENTATION ONLY (OUTPATIENT)
Dept: FAMILY MEDICINE | Facility: CLINIC | Age: 66
End: 2021-02-08

## 2021-02-08 DIAGNOSIS — E03.4 HYPOTHYROIDISM DUE TO ACQUIRED ATROPHY OF THYROID: Primary | ICD-10-CM

## 2021-02-09 DIAGNOSIS — E03.4 HYPOTHYROIDISM DUE TO ACQUIRED ATROPHY OF THYROID: ICD-10-CM

## 2021-02-09 LAB
T3FREE SERPL-MCNC: 1.9 PG/ML (ref 2.3–4.2)
T4 FREE SERPL-MCNC: 1.03 NG/DL (ref 0.76–1.46)
TSH SERPL DL<=0.005 MIU/L-ACNC: 6.49 MU/L (ref 0.4–4)

## 2021-02-09 PROCEDURE — 84443 ASSAY THYROID STIM HORMONE: CPT | Performed by: PHYSICIAN ASSISTANT

## 2021-02-09 PROCEDURE — 84439 ASSAY OF FREE THYROXINE: CPT | Performed by: PHYSICIAN ASSISTANT

## 2021-02-09 PROCEDURE — 84481 FREE ASSAY (FT-3): CPT | Performed by: PHYSICIAN ASSISTANT

## 2021-02-09 PROCEDURE — 36415 COLL VENOUS BLD VENIPUNCTURE: CPT | Performed by: PHYSICIAN ASSISTANT

## 2021-02-10 ENCOUNTER — MYC MEDICAL ADVICE (OUTPATIENT)
Dept: FAMILY MEDICINE | Facility: CLINIC | Age: 66
End: 2021-02-10

## 2021-02-10 DIAGNOSIS — E03.4 HYPOTHYROIDISM DUE TO ACQUIRED ATROPHY OF THYROID: Primary | ICD-10-CM

## 2021-02-10 RX ORDER — LEVOTHYROXINE SODIUM 200 UG/1
200 TABLET ORAL DAILY
Qty: 90 TABLET | Refills: 0 | Status: SHIPPED | OUTPATIENT
Start: 2021-02-10

## 2021-04-20 DIAGNOSIS — F33.1 MAJOR DEPRESSIVE DISORDER, RECURRENT EPISODE, MODERATE (H): ICD-10-CM

## 2021-04-20 RX ORDER — TRAZODONE HYDROCHLORIDE 50 MG/1
100 TABLET, FILM COATED ORAL AT BEDTIME
Qty: 60 TABLET | Refills: 3 | Status: SHIPPED | OUTPATIENT
Start: 2021-04-20 | End: 2021-08-09

## 2021-04-20 NOTE — TELEPHONE ENCOUNTER
Last Written Prescription Date:  10/30/20  Last Fill Quantity: 60,  # refills: 3   Last office visit: 10/30/20 with prescribing provider:  Kaylen Beal PA-C with advised F/U in one year.  Future Office Visit: none    Routing refill request to provider for review/approval because:  Drug interaction warning      Harmony Alexandre, RN, BSN  [facility]

## 2021-07-01 DIAGNOSIS — F33.1 MAJOR DEPRESSIVE DISORDER, RECURRENT EPISODE, MODERATE (H): ICD-10-CM

## 2021-07-05 NOTE — TELEPHONE ENCOUNTER
Left voicemail for patient. Refills should be on file. Prescription sent 10/30/20 330 with 11 refills.     Does she have new insurance? Per 3/15/21 refill note she now has Capri Brandt RN

## 2021-07-06 NOTE — TELEPHONE ENCOUNTER
Left detailed message on patient's identified voice mail.  Advised patient to call [phone] at her earliest convenience if she is in need for a refill.    Sent [employer] message.    Last refill: 10/30/20  Due: 10/30/21  buPROPion (WELLBUTRIN SR) 150 MG 12 hr tablet 30 tablet 11 10/30/2020  No   Sig - Route: Take 1 tablet (150 mg) by mouth daily - Oral   Sent to pharmacy as: buPROPion HCl ER (SR) 150 MG Oral Tablet Extended Release 12 Hour (WELLBUTRIN SR)   Class: E-Prescribe   Notes to Pharmacy: Profile Rx: patient will contact pharmacy when needed   Order: [id number]   E-Prescribing Status: Receipt confirmed by pharmacy (10/30/2020 11:26 AM CDT)

## 2021-07-07 RX ORDER — BUPROPION HYDROCHLORIDE 150 MG/1
TABLET, FILM COATED, EXTENDED RELEASE ORAL
Qty: 30 TABLET | Refills: 0 | Status: SHIPPED | OUTPATIENT
Start: 2021-07-07 | End: 2021-08-04

## 2021-08-12 ENCOUNTER — PATIENT OUTREACH (OUTPATIENT)
Dept: FAMILY MEDICINE | Facility: CLINIC | Age: 66
End: 2021-08-12

## 2021-08-12 NOTE — TELEPHONE ENCOUNTER
Patient Quality Outreach      Summary:    Patient has the following on her problem list/HM:     Depression / Dysthymia review    6 Month Remission: 4-8 month window range: N/A  12 Month Remission: 10-14 month window range: N/A       PHQ-9 SCORE 8/15/2019 1/6/2020 10/30/2020   PHQ-9 Total Score - - -   PHQ-9 Total Score MyChart - - -   PHQ-9 Total Score 9 9 13       If PHQ-9 recheck is 5 or more, route to provider for next steps.    Patient is due/failing the following:   Breast Cancer Screening - Mammogram, PHQ9 sent    Type of outreach:    Sent [employer] message.    Questions for provider review:    None                                                                                                                                     Ciera Rodriguez MA       Chart routed to Care Team.

## 2021-09-02 NOTE — TELEPHONE ENCOUNTER
Patient Quality Outreach 2nd Attempt      Summary:    Type of outreach:    Sent letter.    Next Steps:  Reach out within 90 days via Letter.    Max number of attempts reached: Yes. Will try again in 90 days if patient still on fail list.    Questions for provider review:    None                                                                                                                    Ciera Rodriguez MA

## 2021-09-04 DIAGNOSIS — F33.1 MAJOR DEPRESSIVE DISORDER, RECURRENT EPISODE, MODERATE (H): ICD-10-CM

## 2021-09-07 RX ORDER — TRAZODONE HYDROCHLORIDE 50 MG/1
100 TABLET, FILM COATED ORAL AT BEDTIME
Qty: 180 TABLET | Refills: 0 | Status: SHIPPED | OUTPATIENT
Start: 2021-09-07 | End: 2021-12-01

## 2021-11-16 DIAGNOSIS — F33.1 MAJOR DEPRESSIVE DISORDER, RECURRENT EPISODE, MODERATE (H): ICD-10-CM

## 2021-11-17 RX ORDER — BUPROPION HYDROCHLORIDE 150 MG/1
TABLET, FILM COATED, EXTENDED RELEASE ORAL
Qty: 90 TABLET | Refills: 0 | Status: SHIPPED | OUTPATIENT
Start: 2021-11-17

## 2021-11-17 NOTE — TELEPHONE ENCOUNTER
Medication is being filled for 1 time refill only due to:  Patient needs to be seen because it has been more than one year since last visit.     Gisella Oviedo RN

## 2021-11-26 DIAGNOSIS — F33.1 MAJOR DEPRESSIVE DISORDER, RECURRENT EPISODE, MODERATE (H): ICD-10-CM

## 2021-11-29 DIAGNOSIS — F33.1 MAJOR DEPRESSIVE DISORDER, RECURRENT EPISODE, MODERATE (H): ICD-10-CM

## 2021-11-29 RX ORDER — CITALOPRAM HYDROBROMIDE 20 MG/1
TABLET ORAL
Qty: 30 TABLET | Refills: 0 | OUTPATIENT
Start: 2021-11-29

## 2021-11-29 NOTE — TELEPHONE ENCOUNTER
Routing refill request to provider for review/approval because:  Aimee given x1 and patient did not follow up, please advise

## 2021-12-01 RX ORDER — TRAZODONE HYDROCHLORIDE 50 MG/1
100 TABLET, FILM COATED ORAL AT BEDTIME
Qty: 180 TABLET | Refills: 0 | OUTPATIENT
Start: 2021-12-01

## 2021-12-01 RX ORDER — TRAZODONE HYDROCHLORIDE 50 MG/1
100 TABLET, FILM COATED ORAL AT BEDTIME
Qty: 180 TABLET | Refills: 0 | Status: SHIPPED | OUTPATIENT
Start: 2021-12-01 | End: 2021-12-14

## 2021-12-12 DIAGNOSIS — F33.1 MAJOR DEPRESSIVE DISORDER, RECURRENT EPISODE, MODERATE (H): ICD-10-CM

## 2021-12-12 DIAGNOSIS — E03.4 HYPOTHYROIDISM DUE TO ACQUIRED ATROPHY OF THYROID: ICD-10-CM

## 2021-12-12 NOTE — LETTER
[facility]  [address] [address]  [phone]          December 15, 2021    Shruti Gifford                                                                                                                     [address] [address]            Dear Shruti,    Your provider has sent a  asaf refill of your medications. You are due for an appointment for further refills.  Please contact the clinic to schedule an appointment for further refills.               Sincerely,         Kaylen Beal PA-C

## 2021-12-14 RX ORDER — TRAZODONE HYDROCHLORIDE 50 MG/1
100 TABLET, FILM COATED ORAL AT BEDTIME
Qty: 28 TABLET | Refills: 0 | Status: SHIPPED | OUTPATIENT
Start: 2021-12-14

## 2021-12-14 RX ORDER — LEVOTHYROXINE SODIUM 175 UG/1
TABLET ORAL
Qty: 14 TABLET | Refills: 0 | Status: SHIPPED | OUTPATIENT
Start: 2021-12-14

## 2021-12-14 NOTE — TELEPHONE ENCOUNTER
Routing refill request to provider for review/approval because:  Labs out of range:    TSH   Date Value Ref Range Status   02/09/2021 6.49 (H) 0.40 - 4.00 mU/L Final

## 2021-12-14 NOTE — TELEPHONE ENCOUNTER
Patient needs appointment for future refills.     Kaylen Beal PA-C    Complex Repair And Flap Additional Text (Will Appearing After The Standard Complex Repair Text): The complex repair was not sufficient to completely close the primary defect. The remaining additional defect was repaired with the flap mentioned below.

## 2022-02-20 ENCOUNTER — TELEPHONE (OUTPATIENT)
Dept: FAMILY MEDICINE | Facility: CLINIC | Age: 67
End: 2022-02-20
Payer: COMMERCIAL

## 2022-02-20 DIAGNOSIS — F33.1 MAJOR DEPRESSIVE DISORDER, RECURRENT EPISODE, MODERATE (H): ICD-10-CM

## 2022-02-21 RX ORDER — BUPROPION HYDROCHLORIDE 150 MG/1
TABLET, FILM COATED, EXTENDED RELEASE ORAL
Qty: 90 TABLET | Refills: 0 | OUTPATIENT
Start: 2022-02-21

## 2022-02-21 NOTE — TELEPHONE ENCOUNTER
"Routing refill request to provider for review/approval because:  Aimee given x1 and patient did not follow up, please advise    PHQ-9 score:    PHQ 10/30/2020   PHQ-9 Total Score 13   Q9: Thoughts of better off dead/self-harm past 2 weeks Not at all   F/U: Thoughts of suicide or self-harm -   F/U: Safety concerns -       Requested Prescriptions   Pending Prescriptions Disp Refills     buPROPion (ZYBAN) 150 MG 12 hr tablet [Pharmacy Med Name: BUPROPION HCL  MG TABLET] 90 tablet 0     Sig: TAKE 1 TABLET BY MOUTH EVERY DAY       SSRIs Protocol Failed - 2/20/2022  8:19 AM        Failed - PHQ-9 score less than 5 in past 6 months     Please review last PHQ-9 score.           Failed - Recent (6 mo) or future (30 days) visit within the authorizing provider's specialty     Patient had office visit in the last 6 months or has a visit in the next 30 days with authorizing provider or within the authorizing provider's specialty.  See \"Patient Info\" tab in inbasket, or \"Choose Columns\" in Meds & Orders section of the refill encounter.            Passed - Medication is Bupropion     If the medication is Bupropion (Wellbutrin), and the patient is taking for smoking cessation; OK to refill.          Passed - Medication is active on med list        Passed - Patient is age 18 or older        Passed - No active pregnancy on record        Passed - No positive pregnancy test in last 12 months           Karishma Holley RN  [facility]    "

## 2022-02-22 NOTE — TELEPHONE ENCOUNTER
Has been over a year since last seen.  Aimee refills had already been given before and patient was notified of appointment needs via phone, mychart, and letters.  Per 11/26/2021 refill encounter, patient reported that she changed providers and did not need a refill from us    Please let pharmacy know that patient no longer sees [facility] provider. Please ask them to send all refill requests to patient's new PCP    Major Irene RN  [facility]

## 2022-02-22 NOTE — TELEPHONE ENCOUNTER
[facility] pharmacy notified, will redirect request.   Closing encounter.     [id number], MEDICAL ASSISTANT

## 2022-08-19 NOTE — TELEPHONE ENCOUNTER
Called and left a message for patient regarding message below.  If patient calls back, please schedule her a lab appointment.    Ciera Rodriguez MA     HR=93 bpm, NIBP=96/55 mmhg, SpO2=95.0 %, Resp=13 B/min, EtCO2=29 mmHg, Apnea=2 Seconds